# Patient Record
Sex: MALE | Race: WHITE | NOT HISPANIC OR LATINO | Employment: FULL TIME | URBAN - METROPOLITAN AREA
[De-identification: names, ages, dates, MRNs, and addresses within clinical notes are randomized per-mention and may not be internally consistent; named-entity substitution may affect disease eponyms.]

---

## 2017-02-06 ENCOUNTER — COMMUNICATION - HEALTHEAST (OUTPATIENT)
Dept: INTERNAL MEDICINE | Facility: CLINIC | Age: 22
End: 2017-02-06

## 2018-02-02 ENCOUNTER — OFFICE VISIT - HEALTHEAST (OUTPATIENT)
Dept: INTERNAL MEDICINE | Facility: CLINIC | Age: 23
End: 2018-02-02

## 2018-02-02 DIAGNOSIS — M54.2 NECK PAIN: ICD-10-CM

## 2018-02-02 ASSESSMENT — MIFFLIN-ST. JEOR: SCORE: 1865.17

## 2019-06-03 ENCOUNTER — RECORDS - HEALTHEAST (OUTPATIENT)
Dept: ADMINISTRATIVE | Facility: OTHER | Age: 24
End: 2019-06-03

## 2019-06-17 ENCOUNTER — RECORDS - HEALTHEAST (OUTPATIENT)
Dept: ADMINISTRATIVE | Facility: OTHER | Age: 24
End: 2019-06-17

## 2019-06-18 ENCOUNTER — COMMUNICATION - HEALTHEAST (OUTPATIENT)
Dept: SCHEDULING | Facility: CLINIC | Age: 24
End: 2019-06-18

## 2019-06-21 ENCOUNTER — OFFICE VISIT - HEALTHEAST (OUTPATIENT)
Dept: INTERNAL MEDICINE | Facility: CLINIC | Age: 24
End: 2019-06-21

## 2019-06-21 DIAGNOSIS — Z01.818 PRE-OP EVALUATION: ICD-10-CM

## 2019-06-21 DIAGNOSIS — M25.561 ACUTE PAIN OF RIGHT KNEE: ICD-10-CM

## 2019-06-21 LAB — HGB BLD-MCNC: 15.4 G/DL (ref 14–18)

## 2019-06-21 ASSESSMENT — MIFFLIN-ST. JEOR: SCORE: 1860.64

## 2019-06-28 ENCOUNTER — RECORDS - HEALTHEAST (OUTPATIENT)
Dept: ADMINISTRATIVE | Facility: OTHER | Age: 24
End: 2019-06-28

## 2019-08-07 ENCOUNTER — RECORDS - HEALTHEAST (OUTPATIENT)
Dept: ADMINISTRATIVE | Facility: OTHER | Age: 24
End: 2019-08-07

## 2019-09-18 ENCOUNTER — RECORDS - HEALTHEAST (OUTPATIENT)
Dept: ADMINISTRATIVE | Facility: OTHER | Age: 24
End: 2019-09-18

## 2021-05-29 NOTE — PROGRESS NOTES
Preoperative Exam    Scheduled Procedure: Right knee meniscus  Surgery Date:  6/28/19  Surgery Location: [facility], fax [phone]    Surgeon:  Dr. Snyder    Assessment/Plan:     1. Pre-op evaluation     - Hemoglobin    2. Acute pain of right knee     - Hemoglobin     Surgical Procedure Risk: Low (reported cardiac risk generally < 1%)  Have you had prior anesthesia?: Yes  Have you or any family members had a previous anesthesia reaction:  No  Do you or any family members have a history of a clotting or bleeding disorder?: No  Cardiac Risk Assessment: no increased risk for major cardiac complications    Patient approved for surgery with general or local anesthesia.    Functional Status: Independent  Patient plans to recover at home with family.     Subjective:      Jean-Paul Carver is a 23 y.o. male who presents for a preoperative consultation.  Right knee meniscus repair    All other systems reviewed and are negative, other than those listed in the HPI.    Pertinent History  Do you have difficulty breathing or chest pain after walking up a flight of stairs: No  History of obstructive sleep apnea: No  Steroid use in the last 6 months: No  Frequent Aspirin/NSAID use: No  Prior Blood Transfusion: No  Prior Blood Transfusion Reaction: No  If for some reason prior to, during or after the procedure, if it is medically indicated, would you be willing to have a blood transfusion?:  There is no transfusion refusal.     Able to walk 4 blocks or climb 2 flights of stairs: Yes  Use alcohol: 1 a week  Use tobacco: no  Use illicit drugs: no  History of kidney or liver disease: no  History of  MI or angina: no  History of irregular heartbeat: no  History of CVA: no  History of epilepsy: no  History of CHF: no  History of asthma or COPD: no  History Diabetes: no    Last tetanus:2015    ______________________________________________________________________    STOPBANG LAURYN ASSESSMENT    1.  Do  you snore loudly (louder than talking or loud enough to be heard through closed doors):  n    2.  Do you often feel tired, fatigued, or sleepy during the day:  n    3.  Has anyone observed you stop breathing during sleep:  n    4.  Do you have or are you being treated for high blood pressure:  n    5.  Body mass index > 35:  Body mass index is 21.63 kg/m .    6.  Age > 50:  23 y.o.     7.  Neck circumference greater than 40 cm:  n    8.  Gender male:  male     Total score:  0    A score of 3 or greater indicates a risk for sleep apnea (84% sensitivity).  A score of 5 or greater is more predictive of clinically relevant moderate to severe obstructive sleep apnea.    ______________________________________________________________________          Current Outpatient Medications   Medication Sig Dispense Refill     ascorbic acid, vitamin C, (ASCORBIC ACID WITH CHRIS HIPS) 500 MG tablet Take 500 mg by mouth daily.       MULTIVITAMIN ORAL Take by mouth.       methylPREDNISolone (MEDROL DOSEPACK) 4 mg tablet Follow the package directions. 21 tablet 0     No current facility-administered medications for this visit.         Allergies   Allergen Reactions     Amoxicillin Other (See Comments)     Baby-mom does not remember exactly the reaction that he had.       Patient Active Problem List   Diagnosis     Healthy adult     Neck pain       Past Medical History:   Diagnosis Date     Insomnia      Joint pain      Neuromuscular disorder (H)        No past surgical history on file.    Social History     Socioeconomic History     Marital status: Single     Spouse name: Not on file     Number of children: Not on file     Years of education: Not on file     Highest education level: Not on file   Occupational History     Not on file   Social Needs     Financial resource strain: Not on file     Food insecurity:     Worry: Not on file     Inability: Not on file     Transportation needs:     Medical: Not on file     Non-medical: Not on file  "  Tobacco Use     Smoking status: Never Smoker     Smokeless tobacco: Never Used   Substance and Sexual Activity     Alcohol use: Yes     Comment: occasional     Drug use: No     Sexual activity: Not on file   Lifestyle     Physical activity:     Days per week: Not on file     Minutes per session: Not on file     Stress: Not on file   Relationships     Social connections:     Talks on phone: Not on file     Gets together: Not on file     Attends Jehovah's witness service: Not on file     Active member of club or organization: Not on file     Attends meetings of clubs or organizations: Not on file     Relationship status: Not on file     Intimate partner violence:     Fear of current or ex partner: Not on file     Emotionally abused: Not on file     Physically abused: Not on file     Forced sexual activity: Not on file   Other Topics Concern     Not on file   Social History Narrative    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             Objective:     Vitals:    06/21/19 0939   BP: 98/76   Pulse: (!) 52   SpO2: 100%   Weight: 173 lb 1 oz (78.5 kg)   Height: 6' 3\" (1.905 m)         Physical Exam:  Gen: NAD, conversant, appears age, well-kempt  Skin: warm, dry, no rash, pallor cyanosis  HENT: normocephalic atraumatic, MMM, no oral lesions, otorrhea, rhinorrhea.   Eyes: non-icteric, extra-ocular movements intact, PERRL, conjunctivae not injected. Holding eyes open comfortably, no drainage.  CV: NRRR no m/r/g, no peripheral edema. no JVD.  Resp: CTAB no w/r/r, normal respiratory effort  GI: soft, non-tender, non-distended. No masses.  MSK: no muscle or joint swelling.  Neuro: no dysarthria or gross asymmetry  Psych: full affect, oriented x 3  Lymph: No significant cervical lymphadenopathy  Hematologic: No petechiae or purpura.      There are no Patient Instructions on file for this visit.        Labs:      Immunization History   Administered Date(s) Administered     DTaP / HiB 05/11/1998     DTaP, " historic 02/10/1997, 03/17/1997, 05/08/1997, 05/11/1998, 05/07/2001     Dtap 02/10/1997, 03/17/1997, 05/08/1997, 05/07/2001     HPV Quadrivalent 08/13/2012, 03/11/2016     Hep A, historic 08/31/2009, 08/13/2012     Hep B, Peds or Adolescent 1996, 02/10/1997, 08/19/1997     Hep B, historic 02/10/1997, 08/19/1997, 11/07/1997     Hepatitis A, Ped/Adol 2 Dose IM (18yr & under) 08/31/2009, 08/13/2012     HiB, historic,unspecified 02/10/1997, 03/17/1997, 05/08/1997, 05/11/1998     Hib (PRP-T) 02/10/1997, 03/17/1997, 05/08/1997     IPV 02/10/1997, 03/17/1997, 12/11/1997, 05/07/2001     Influenza, Live, Nasal LAIV3 08/13/2012     MMR 12/11/1997, 05/07/2001     Meningococcal MCV4 Conjugate,Unspecified 08/13/2012     Meningococcal MCV4P 08/13/2012, 08/12/2015     Td,adult,historic,unspecified 01/04/2008     Tdap 01/04/2008, 08/12/2015     Varicella 12/11/1997           Electronically signed by Brady Robb MD 06/21/19 9:41 AM

## 2021-05-29 NOTE — PATIENT INSTRUCTIONS - HE
Whole Foods, Plant-Based    Abundant vegetables.    Only whole grains.    2-4 fruits/day.    Avoid animal products such as dairy, eggs and meat. (instead, take a vitamin b12 supplement)    Avoid sugars, oils and other processed foods.    Documentary: [employer]     Web: Nutritionstudies.org, Nutritionfacts.org    Books: How Not to Die (Tonia), The [place] Study (Durga)    Avoid nsaids prior to surgery

## 2021-06-01 VITALS — BODY MASS INDEX: 21.64 KG/M2 | HEIGHT: 75 IN | WEIGHT: 174.06 LBS

## 2021-06-03 VITALS — WEIGHT: 173.06 LBS | BODY MASS INDEX: 21.52 KG/M2 | HEIGHT: 75 IN

## 2021-06-15 NOTE — PROGRESS NOTES
"[facility] Follow Up Note    Jean-Paul Carver   22 y.o. male    Date of Visit: 2/2/2018    Chief Complaint   Patient presents with     Neck Pain      just had URI - seen at [facility] 2/1/18     Subjective  Jean-Paul comes in today with acute neck pain.  2 days ago he started having neck pain later in the day to the point where he was unable to move his neck well.  He also started to get URI type symptoms.  Yesterday he went to an urgent care through an outside system and was told it was just a URI.  This morning when he awoke he was unable to get out of bed without help from his dad because it hurt to move his neck.  As he has been up and moving around, it seems to be slightly better.  The pain is in the lower neck portion and radiates to the upper shoulders but does not go down his arms.  He denies any weakness of his arms.  He denies any trauma, new pillows or new activities.  This did start after he was in a class for 6 hours and may have had his head looking down but he does not recollect doing it for long periods of time.    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       Medications were reconciled.  Allergies, social and family history, and the problem list were all reviewed and updated.      Exam  General Appearance: Pleasant and alert  Vitals:    02/02/18 0933   BP: 126/74   Patient Site: Left Arm   Patient Position: Sitting   Cuff Size: Adult Regular   Pulse: 92   Resp: 16   SpO2: 98%   Weight: 174 lb 1 oz (79 kg)   Height: 6' 3\" (1.905 m)      Body mass index is 21.76 kg/(m^2).  Wt Readings from Last 3 Encounters:   02/02/18 174 lb 1 oz (79 kg)   08/12/15 172 lb 9.6 oz (78.3 kg) (78 %, Z= 0.76)*     * Growth percentiles are based on CDC 2-20 Years data.     HEENT: Sclera are clear.   Neck: Bony prominence at about " C7/8 with some mild tenderness around this area.  Lungs: Normal respirations  Abdomen: Soft and nondistended  Extremities: No edema  Skin: No rashes  Neuro: Moves all extremities and has facial symmetry  Gait: Ambulates with a normal gait    Assessment/Plan  1. Neck pain  We discussed this, will elect to treat with a Medrol Dosepak and tramadol as needed for pain for now.  If the pain does not resolve, or gets worse would recommend an MRI scan due to the bony prominence present upon palpation..          Bianca Velasco MD  2/2/2018    Much or all of the text in this note was generated through the use of Dragon Dictate voice-to-text software. Errors in spelling or words which seem out of context are unintentional. Sound alike errors, in particular, may have escaped editing.

## 2021-06-16 PROBLEM — M54.2 NECK PAIN: Status: ACTIVE | Noted: 2018-02-02

## 2021-07-31 ENCOUNTER — HEALTH MAINTENANCE LETTER (OUTPATIENT)
Age: 26
End: 2021-07-31

## 2021-09-25 ENCOUNTER — HEALTH MAINTENANCE LETTER (OUTPATIENT)
Age: 26
End: 2021-09-25

## 2022-08-27 ENCOUNTER — HEALTH MAINTENANCE LETTER (OUTPATIENT)
Age: 27
End: 2022-08-27

## 2023-01-07 ENCOUNTER — HEALTH MAINTENANCE LETTER (OUTPATIENT)
Age: 28
End: 2023-01-07

## 2023-07-20 ENCOUNTER — APPOINTMENT (OUTPATIENT)
Dept: GENERAL RADIOLOGY | Facility: CLINIC | Age: 28
End: 2023-07-20
Attending: NURSE PRACTITIONER
Payer: COMMERCIAL

## 2023-07-20 ENCOUNTER — HOSPITAL ENCOUNTER (INPATIENT)
Facility: CLINIC | Age: 28
LOS: 2 days | Discharge: HOME | End: 2023-07-23
Attending: EMERGENCY MEDICINE | Admitting: ORTHOPAEDIC SURGERY
Payer: COMMERCIAL

## 2023-07-20 ENCOUNTER — APPOINTMENT (OUTPATIENT)
Dept: GENERAL RADIOLOGY | Facility: CLINIC | Age: 28
End: 2023-07-20
Attending: EMERGENCY MEDICINE
Payer: COMMERCIAL

## 2023-07-20 ENCOUNTER — APPOINTMENT (OUTPATIENT)
Dept: CT IMAGING | Facility: CLINIC | Age: 28
End: 2023-07-20
Attending: EMERGENCY MEDICINE
Payer: COMMERCIAL
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
## 2023-07-20 DIAGNOSIS — S82.142A CLOSED DISPLACED BICONDYLAR FRACTURE OF LEFT TIBIA: Primary | ICD-10-CM

## 2023-07-20 DIAGNOSIS — S83.105A LEFT KNEE DISLOCATION, INITIAL ENCOUNTER: ICD-10-CM

## 2023-07-20 DIAGNOSIS — S82.102A CLOSED FRACTURE OF PROXIMAL END OF LEFT TIBIA, UNSPECIFIED FRACTURE MORPHOLOGY, INITIAL ENCOUNTER: ICD-10-CM

## 2023-07-20 LAB
ABO/RH(D): NORMAL
ANION GAP SERPL CALCULATED.3IONS-SCNC: 13 MMOL/L (ref 7–15)
ANTIBODY SCREEN: NEGATIVE
APTT PPP: 27 SECONDS (ref 22–38)
BASOPHILS # BLD AUTO: 0 10E3/UL (ref 0–0.2)
BASOPHILS NFR BLD AUTO: 1 %
BUN SERPL-MCNC: 12.1 MG/DL (ref 6–20)
CALCIUM SERPL-MCNC: 9.4 MG/DL (ref 8.6–10)
CHLORIDE SERPL-SCNC: 103 MMOL/L (ref 98–107)
CREAT SERPL-MCNC: 1.1 MG/DL (ref 0.67–1.17)
DEPRECATED HCO3 PLAS-SCNC: 23 MMOL/L (ref 22–29)
EOSINOPHIL # BLD AUTO: 0.1 10E3/UL (ref 0–0.7)
EOSINOPHIL NFR BLD AUTO: 1 %
ERYTHROCYTE [DISTWIDTH] IN BLOOD BY AUTOMATED COUNT: 11.7 % (ref 10–15)
GFR SERPL CREATININE-BSD FRML MDRD: >90 ML/MIN/1.73M2
GLUCOSE SERPL-MCNC: 101 MG/DL (ref 70–99)
HCT VFR BLD AUTO: 45.2 % (ref 40–53)
HGB BLD-MCNC: 15.8 G/DL (ref 13.3–17.7)
IMM GRANULOCYTES # BLD: 0 10E3/UL
IMM GRANULOCYTES NFR BLD: 1 %
INR PPP: 1.06 (ref 0.85–1.15)
LYMPHOCYTES # BLD AUTO: 1.6 10E3/UL (ref 0.8–5.3)
LYMPHOCYTES NFR BLD AUTO: 20 %
MCH RBC QN AUTO: 31 PG (ref 26.5–33)
MCHC RBC AUTO-ENTMCNC: 35 G/DL (ref 31.5–36.5)
MCV RBC AUTO: 89 FL (ref 78–100)
MONOCYTES # BLD AUTO: 0.8 10E3/UL (ref 0–1.3)
MONOCYTES NFR BLD AUTO: 10 %
NEUTROPHILS # BLD AUTO: 5.4 10E3/UL (ref 1.6–8.3)
NEUTROPHILS NFR BLD AUTO: 67 %
NRBC # BLD AUTO: 0 10E3/UL
NRBC BLD AUTO-RTO: 0 /100
PLATELET # BLD AUTO: 167 10E3/UL (ref 150–450)
POTASSIUM SERPL-SCNC: 3.4 MMOL/L (ref 3.4–5.3)
RBC # BLD AUTO: 5.1 10E6/UL (ref 4.4–5.9)
SODIUM SERPL-SCNC: 139 MMOL/L (ref 136–145)
SPECIMEN EXPIRATION DATE: NORMAL
WBC # BLD AUTO: 8 10E3/UL (ref 4–11)

## 2023-07-20 PROCEDURE — 80048 BASIC METABOLIC PNL TOTAL CA: CPT | Performed by: EMERGENCY MEDICINE

## 2023-07-20 PROCEDURE — 85025 COMPLETE CBC W/AUTO DIFF WBC: CPT | Performed by: EMERGENCY MEDICINE

## 2023-07-20 PROCEDURE — 250N000013 HC RX MED GY IP 250 OP 250 PS 637: Performed by: EMERGENCY MEDICINE

## 2023-07-20 PROCEDURE — 73562 X-RAY EXAM OF KNEE 3: CPT | Mod: 26 | Performed by: RADIOLOGY

## 2023-07-20 PROCEDURE — 36415 COLL VENOUS BLD VENIPUNCTURE: CPT | Performed by: EMERGENCY MEDICINE

## 2023-07-20 PROCEDURE — 73706 CT ANGIO LWR EXTR W/O&W/DYE: CPT | Mod: LT

## 2023-07-20 PROCEDURE — 96375 TX/PRO/DX INJ NEW DRUG ADDON: CPT | Performed by: EMERGENCY MEDICINE

## 2023-07-20 PROCEDURE — 73706 CT ANGIO LWR EXTR W/O&W/DYE: CPT | Mod: 26 | Performed by: RADIOLOGY

## 2023-07-20 PROCEDURE — 258N000003 HC RX IP 258 OP 636: Performed by: EMERGENCY MEDICINE

## 2023-07-20 PROCEDURE — 85610 PROTHROMBIN TIME: CPT | Performed by: EMERGENCY MEDICINE

## 2023-07-20 PROCEDURE — 99285 EMERGENCY DEPT VISIT HI MDM: CPT | Mod: 25 | Performed by: EMERGENCY MEDICINE

## 2023-07-20 PROCEDURE — 85730 THROMBOPLASTIN TIME PARTIAL: CPT | Performed by: EMERGENCY MEDICINE

## 2023-07-20 PROCEDURE — 86900 BLOOD TYPING SEROLOGIC ABO: CPT | Performed by: EMERGENCY MEDICINE

## 2023-07-20 PROCEDURE — 250N000011 HC RX IP 250 OP 636

## 2023-07-20 PROCEDURE — 73590 X-RAY EXAM OF LOWER LEG: CPT | Mod: 26 | Performed by: RADIOLOGY

## 2023-07-20 PROCEDURE — 73560 X-RAY EXAM OF KNEE 1 OR 2: CPT | Mod: 26 | Performed by: RADIOLOGY

## 2023-07-20 PROCEDURE — 250N000011 HC RX IP 250 OP 636: Performed by: EMERGENCY MEDICINE

## 2023-07-20 PROCEDURE — 250N000011 HC RX IP 250 OP 636: Mod: JZ | Performed by: NURSE PRACTITIONER

## 2023-07-20 PROCEDURE — 96374 THER/PROPH/DIAG INJ IV PUSH: CPT | Performed by: EMERGENCY MEDICINE

## 2023-07-20 PROCEDURE — 96376 TX/PRO/DX INJ SAME DRUG ADON: CPT | Performed by: EMERGENCY MEDICINE

## 2023-07-20 PROCEDURE — 99285 EMERGENCY DEPT VISIT HI MDM: CPT | Performed by: EMERGENCY MEDICINE

## 2023-07-20 PROCEDURE — 73590 X-RAY EXAM OF LOWER LEG: CPT | Mod: LT

## 2023-07-20 PROCEDURE — 96361 HYDRATE IV INFUSION ADD-ON: CPT | Performed by: EMERGENCY MEDICINE

## 2023-07-20 PROCEDURE — 73562 X-RAY EXAM OF KNEE 3: CPT | Mod: LT

## 2023-07-20 PROCEDURE — 999N000065 XR KNEE PORT LEFT 1/2 VIEWS: Mod: LT

## 2023-07-20 RX ORDER — ONDANSETRON 2 MG/ML
4 INJECTION INTRAMUSCULAR; INTRAVENOUS ONCE
Status: COMPLETED | OUTPATIENT
Start: 2023-07-20 | End: 2023-07-20

## 2023-07-20 RX ORDER — SODIUM CHLORIDE 9 MG/ML
INJECTION, SOLUTION INTRAVENOUS CONTINUOUS PRN
Status: COMPLETED | OUTPATIENT
Start: 2023-07-20 | End: 2023-07-20

## 2023-07-20 RX ORDER — PROPOFOL 10 MG/ML
INJECTION, EMULSION INTRAVENOUS
Status: COMPLETED
Start: 2023-07-20 | End: 2023-07-20

## 2023-07-20 RX ORDER — PROPOFOL 10 MG/ML
.5-1 INJECTION, EMULSION INTRAVENOUS ONCE
Status: COMPLETED | OUTPATIENT
Start: 2023-07-20 | End: 2023-07-20

## 2023-07-20 RX ORDER — IOPAMIDOL 755 MG/ML
100 INJECTION, SOLUTION INTRAVASCULAR ONCE
Status: COMPLETED | OUTPATIENT
Start: 2023-07-20 | End: 2023-07-21

## 2023-07-20 RX ORDER — OXYCODONE HYDROCHLORIDE 5 MG/1
5 TABLET ORAL ONCE
Status: COMPLETED | OUTPATIENT
Start: 2023-07-20 | End: 2023-07-20

## 2023-07-20 RX ORDER — KETOROLAC TROMETHAMINE 30 MG/ML
30 INJECTION, SOLUTION INTRAMUSCULAR; INTRAVENOUS ONCE
Status: COMPLETED | OUTPATIENT
Start: 2023-07-20 | End: 2023-07-20

## 2023-07-20 RX ORDER — PROPOFOL 10 MG/ML
INJECTION, EMULSION INTRAVENOUS DAILY PRN
Status: COMPLETED | OUTPATIENT
Start: 2023-07-20 | End: 2023-07-20

## 2023-07-20 RX ADMIN — ONDANSETRON 4 MG: 2 INJECTION INTRAMUSCULAR; INTRAVENOUS at 20:42

## 2023-07-20 RX ADMIN — HYDROMORPHONE HYDROCHLORIDE 1 MG: 1 INJECTION, SOLUTION INTRAMUSCULAR; INTRAVENOUS; SUBCUTANEOUS at 20:42

## 2023-07-20 RX ADMIN — PROPOFOL 80 MG: 10 INJECTION, EMULSION INTRAVENOUS at 23:00

## 2023-07-20 RX ADMIN — KETOROLAC TROMETHAMINE 30 MG: 30 INJECTION, SOLUTION INTRAMUSCULAR at 20:40

## 2023-07-20 RX ADMIN — PROPOFOL 40 MG: 10 INJECTION, EMULSION INTRAVENOUS at 23:02

## 2023-07-20 RX ADMIN — PROPOFOL 20 MG: 10 INJECTION, EMULSION INTRAVENOUS at 23:03

## 2023-07-20 RX ADMIN — OXYCODONE HYDROCHLORIDE 5 MG: 5 TABLET ORAL at 20:40

## 2023-07-20 RX ADMIN — PROPOFOL 40 MG: 10 INJECTION, EMULSION INTRAVENOUS at 23:01

## 2023-07-20 RX ADMIN — HYDROMORPHONE HYDROCHLORIDE 1 MG: 1 INJECTION, SOLUTION INTRAMUSCULAR; INTRAVENOUS; SUBCUTANEOUS at 22:30

## 2023-07-20 RX ADMIN — PROPOFOL 80 MG: 10 INJECTION, EMULSION INTRAVENOUS at 23:29

## 2023-07-20 RX ADMIN — SODIUM CHLORIDE 1000 ML: 0.9 INJECTION, SOLUTION INTRAVENOUS at 23:02

## 2023-07-20 ASSESSMENT — ACTIVITIES OF DAILY LIVING (ADL)
ADLS_ACUITY_SCORE: 35
ADLS_ACUITY_SCORE: 35

## 2023-07-21 ENCOUNTER — ANESTHESIA EVENT (OUTPATIENT)
Dept: SURGERY | Facility: CLINIC | Age: 28
End: 2023-07-21
Payer: COMMERCIAL

## 2023-07-21 ENCOUNTER — APPOINTMENT (OUTPATIENT)
Dept: GENERAL RADIOLOGY | Facility: CLINIC | Age: 28
End: 2023-07-21
Attending: ORTHOPAEDIC SURGERY
Payer: COMMERCIAL

## 2023-07-21 ENCOUNTER — ANESTHESIA (OUTPATIENT)
Dept: SURGERY | Facility: CLINIC | Age: 28
End: 2023-07-21
Payer: COMMERCIAL

## 2023-07-21 ENCOUNTER — APPOINTMENT (OUTPATIENT)
Dept: CT IMAGING | Facility: CLINIC | Age: 28
End: 2023-07-21
Attending: ORTHOPAEDIC SURGERY
Payer: COMMERCIAL

## 2023-07-21 PROBLEM — S83.105A LEFT KNEE DISLOCATION, INITIAL ENCOUNTER: Status: ACTIVE | Noted: 2023-07-21

## 2023-07-21 PROBLEM — S82.102A CLOSED FRACTURE OF PROXIMAL END OF LEFT TIBIA, UNSPECIFIED FRACTURE MORPHOLOGY, INITIAL ENCOUNTER: Status: ACTIVE | Noted: 2023-07-21

## 2023-07-21 PROCEDURE — 250N000009 HC RX 250: Performed by: EMERGENCY MEDICINE

## 2023-07-21 PROCEDURE — 360N000082 HC SURGERY LEVEL 2 W/ FLUORO, PER MIN: Performed by: ORTHOPAEDIC SURGERY

## 2023-07-21 PROCEDURE — 250N000011 HC RX IP 250 OP 636: Performed by: ORTHOPAEDIC SURGERY

## 2023-07-21 PROCEDURE — 250N000011 HC RX IP 250 OP 636: Mod: JZ | Performed by: NURSE ANESTHETIST, CERTIFIED REGISTERED

## 2023-07-21 PROCEDURE — 120N000002 HC R&B MED SURG/OB UMMC

## 2023-07-21 PROCEDURE — 250N000025 HC SEVOFLURANE, PER MIN: Performed by: ORTHOPAEDIC SURGERY

## 2023-07-21 PROCEDURE — 272N000001 HC OR GENERAL SUPPLY STERILE: Performed by: ORTHOPAEDIC SURGERY

## 2023-07-21 PROCEDURE — 250N000009 HC RX 250: Performed by: ORTHOPAEDIC SURGERY

## 2023-07-21 PROCEDURE — 250N000013 HC RX MED GY IP 250 OP 250 PS 637

## 2023-07-21 PROCEDURE — 250N000011 HC RX IP 250 OP 636: Performed by: EMERGENCY MEDICINE

## 2023-07-21 PROCEDURE — 73700 CT LOWER EXTREMITY W/O DYE: CPT | Mod: 26 | Performed by: RADIOLOGY

## 2023-07-21 PROCEDURE — 258N000003 HC RX IP 258 OP 636: Performed by: NURSE ANESTHETIST, CERTIFIED REGISTERED

## 2023-07-21 PROCEDURE — L4396 STATIC OR DYNAMI AFO PRE CST: HCPCS

## 2023-07-21 PROCEDURE — 999N000065 XR KNEE PORT LEFT 1/2 VIEWS: Mod: LT

## 2023-07-21 PROCEDURE — 999N000180 XR SURGERY CARM FLUORO LESS THAN 5 MIN: Mod: TC

## 2023-07-21 PROCEDURE — 710N000010 HC RECOVERY PHASE 1, LEVEL 2, PER MIN: Performed by: ORTHOPAEDIC SURGERY

## 2023-07-21 PROCEDURE — 250N000011 HC RX IP 250 OP 636: Mod: JZ

## 2023-07-21 PROCEDURE — C1713 ANCHOR/SCREW BN/BN,TIS/BN: HCPCS | Performed by: ORTHOPAEDIC SURGERY

## 2023-07-21 PROCEDURE — 250N000011 HC RX IP 250 OP 636: Performed by: ANESTHESIOLOGY

## 2023-07-21 PROCEDURE — 250N000009 HC RX 250: Performed by: NURSE ANESTHETIST, CERTIFIED REGISTERED

## 2023-07-21 PROCEDURE — 73700 CT LOWER EXTREMITY W/O DYE: CPT | Mod: LT

## 2023-07-21 PROCEDURE — 250N000011 HC RX IP 250 OP 636: Mod: JZ | Performed by: ORTHOPAEDIC SURGERY

## 2023-07-21 PROCEDURE — 96361 HYDRATE IV INFUSION ADD-ON: CPT | Performed by: EMERGENCY MEDICINE

## 2023-07-21 PROCEDURE — 0QSH35Z REPOSITION LEFT TIBIA WITH EXTERNAL FIXATION DEVICE, PERCUTANEOUS APPROACH: ICD-10-PCS | Performed by: ORTHOPAEDIC SURGERY

## 2023-07-21 PROCEDURE — 96376 TX/PRO/DX INJ SAME DRUG ADON: CPT | Performed by: EMERGENCY MEDICINE

## 2023-07-21 PROCEDURE — 20692 APPL MLTPLN UNI EXT FIXJ SYS: CPT | Mod: LT | Performed by: ORTHOPAEDIC SURGERY

## 2023-07-21 PROCEDURE — 999N000141 HC STATISTIC PRE-PROCEDURE NURSING ASSESSMENT: Performed by: ORTHOPAEDIC SURGERY

## 2023-07-21 PROCEDURE — 370N000017 HC ANESTHESIA TECHNICAL FEE, PER MIN: Performed by: ORTHOPAEDIC SURGERY

## 2023-07-21 DEVICE — IMPLANTABLE DEVICE: Type: IMPLANTABLE DEVICE | Site: LEG | Status: FUNCTIONAL

## 2023-07-21 RX ORDER — OXYCODONE HYDROCHLORIDE 5 MG/1
5 TABLET ORAL EVERY 4 HOURS PRN
Status: DISCONTINUED | OUTPATIENT
Start: 2023-07-21 | End: 2023-07-24 | Stop reason: HOSPADM

## 2023-07-21 RX ORDER — HYDROMORPHONE HYDROCHLORIDE 1 MG/ML
0.2 INJECTION, SOLUTION INTRAMUSCULAR; INTRAVENOUS; SUBCUTANEOUS EVERY 5 MIN PRN
Status: DISCONTINUED | OUTPATIENT
Start: 2023-07-21 | End: 2023-07-21 | Stop reason: HOSPADM

## 2023-07-21 RX ORDER — TRANEXAMIC ACID 10 MG/ML
1 INJECTION, SOLUTION INTRAVENOUS ONCE
Status: COMPLETED | OUTPATIENT
Start: 2023-07-21 | End: 2023-07-21

## 2023-07-21 RX ORDER — ONDANSETRON 2 MG/ML
INJECTION INTRAMUSCULAR; INTRAVENOUS PRN
Status: DISCONTINUED | OUTPATIENT
Start: 2023-07-21 | End: 2023-07-21

## 2023-07-21 RX ORDER — METHOCARBAMOL 750 MG/1
750 TABLET, FILM COATED ORAL EVERY 6 HOURS PRN
Status: DISCONTINUED | OUTPATIENT
Start: 2023-07-21 | End: 2023-07-24 | Stop reason: HOSPADM

## 2023-07-21 RX ORDER — HYDROMORPHONE HYDROCHLORIDE 1 MG/ML
0.4 INJECTION, SOLUTION INTRAMUSCULAR; INTRAVENOUS; SUBCUTANEOUS EVERY 5 MIN PRN
Status: DISCONTINUED | OUTPATIENT
Start: 2023-07-21 | End: 2023-07-21 | Stop reason: HOSPADM

## 2023-07-21 RX ORDER — HYDROMORPHONE HYDROCHLORIDE 1 MG/ML
0.2 INJECTION, SOLUTION INTRAMUSCULAR; INTRAVENOUS; SUBCUTANEOUS
Status: DISCONTINUED | OUTPATIENT
Start: 2023-07-21 | End: 2023-07-24 | Stop reason: HOSPADM

## 2023-07-21 RX ORDER — POLYETHYLENE GLYCOL 3350 17 G/17G
17 POWDER, FOR SOLUTION ORAL DAILY
Status: DISCONTINUED | OUTPATIENT
Start: 2023-07-22 | End: 2023-07-24 | Stop reason: HOSPADM

## 2023-07-21 RX ORDER — HYDROXYZINE HYDROCHLORIDE 25 MG/1
25 TABLET, FILM COATED ORAL EVERY 6 HOURS PRN
Status: DISCONTINUED | OUTPATIENT
Start: 2023-07-21 | End: 2023-07-24 | Stop reason: HOSPADM

## 2023-07-21 RX ORDER — ACETAMINOPHEN 325 MG/1
325-650 TABLET ORAL EVERY 6 HOURS PRN
COMMUNITY

## 2023-07-21 RX ORDER — ACETAMINOPHEN 325 MG/1
975 TABLET ORAL EVERY 8 HOURS
Status: DISCONTINUED | OUTPATIENT
Start: 2023-07-21 | End: 2023-07-24 | Stop reason: HOSPADM

## 2023-07-21 RX ORDER — ACETAMINOPHEN 325 MG/1
650 TABLET ORAL EVERY 4 HOURS PRN
Status: DISCONTINUED | OUTPATIENT
Start: 2023-07-24 | End: 2023-07-24 | Stop reason: HOSPADM

## 2023-07-21 RX ORDER — MULTIPLE VITAMINS W/ MINERALS TAB 9MG-400MCG
1 TAB ORAL DAILY
COMMUNITY

## 2023-07-21 RX ORDER — ONDANSETRON 2 MG/ML
4 INJECTION INTRAMUSCULAR; INTRAVENOUS EVERY 6 HOURS PRN
Status: DISCONTINUED | OUTPATIENT
Start: 2023-07-21 | End: 2023-07-24 | Stop reason: HOSPADM

## 2023-07-21 RX ORDER — OXYCODONE HYDROCHLORIDE 10 MG/1
10 TABLET ORAL EVERY 4 HOURS PRN
Status: DISCONTINUED | OUTPATIENT
Start: 2023-07-21 | End: 2023-07-24 | Stop reason: HOSPADM

## 2023-07-21 RX ORDER — ONDANSETRON 2 MG/ML
4 INJECTION INTRAMUSCULAR; INTRAVENOUS EVERY 30 MIN PRN
Status: DISCONTINUED | OUTPATIENT
Start: 2023-07-21 | End: 2023-07-21 | Stop reason: HOSPADM

## 2023-07-21 RX ORDER — SODIUM CHLORIDE, SODIUM LACTATE, POTASSIUM CHLORIDE, CALCIUM CHLORIDE 600; 310; 30; 20 MG/100ML; MG/100ML; MG/100ML; MG/100ML
INJECTION, SOLUTION INTRAVENOUS CONTINUOUS
Status: DISCONTINUED | OUTPATIENT
Start: 2023-07-21 | End: 2023-07-24 | Stop reason: HOSPADM

## 2023-07-21 RX ORDER — FENTANYL CITRATE 50 UG/ML
INJECTION, SOLUTION INTRAMUSCULAR; INTRAVENOUS PRN
Status: DISCONTINUED | OUTPATIENT
Start: 2023-07-21 | End: 2023-07-21

## 2023-07-21 RX ORDER — CEFAZOLIN SODIUM/WATER 2 G/20 ML
2 SYRINGE (ML) INTRAVENOUS EVERY 8 HOURS
Status: DISCONTINUED | OUTPATIENT
Start: 2023-07-21 | End: 2023-07-21

## 2023-07-21 RX ORDER — IBUPROFEN 200 MG
200 TABLET ORAL EVERY 4 HOURS PRN
COMMUNITY

## 2023-07-21 RX ORDER — AMOXICILLIN 250 MG
1 CAPSULE ORAL 2 TIMES DAILY
Status: DISCONTINUED | OUTPATIENT
Start: 2023-07-21 | End: 2023-07-24 | Stop reason: HOSPADM

## 2023-07-21 RX ORDER — CEFAZOLIN SODIUM 2 G/100ML
2 INJECTION, SOLUTION INTRAVENOUS EVERY 8 HOURS
Status: COMPLETED | OUTPATIENT
Start: 2023-07-21 | End: 2023-07-22

## 2023-07-21 RX ORDER — LANOLIN ALCOHOL/MO/W.PET/CERES
3 CREAM (GRAM) TOPICAL
Status: DISCONTINUED | OUTPATIENT
Start: 2023-07-21 | End: 2023-07-24 | Stop reason: HOSPADM

## 2023-07-21 RX ORDER — OXYCODONE HYDROCHLORIDE 5 MG/1
5 TABLET ORAL
Status: DISCONTINUED | OUTPATIENT
Start: 2023-07-21 | End: 2023-07-21 | Stop reason: HOSPADM

## 2023-07-21 RX ORDER — ASPIRIN 81 MG/1
81 TABLET ORAL 2 TIMES DAILY
Status: DISCONTINUED | OUTPATIENT
Start: 2023-07-22 | End: 2023-07-21

## 2023-07-21 RX ORDER — LABETALOL HYDROCHLORIDE 5 MG/ML
10 INJECTION, SOLUTION INTRAVENOUS
Status: DISCONTINUED | OUTPATIENT
Start: 2023-07-21 | End: 2023-07-21 | Stop reason: HOSPADM

## 2023-07-21 RX ORDER — KETOROLAC TROMETHAMINE 30 MG/ML
15 INJECTION, SOLUTION INTRAMUSCULAR; INTRAVENOUS EVERY 6 HOURS
Status: COMPLETED | OUTPATIENT
Start: 2023-07-21 | End: 2023-07-22

## 2023-07-21 RX ORDER — DEXAMETHASONE SODIUM PHOSPHATE 4 MG/ML
INJECTION, SOLUTION INTRA-ARTICULAR; INTRALESIONAL; INTRAMUSCULAR; INTRAVENOUS; SOFT TISSUE PRN
Status: DISCONTINUED | OUTPATIENT
Start: 2023-07-21 | End: 2023-07-21

## 2023-07-21 RX ORDER — SODIUM CHLORIDE, SODIUM LACTATE, POTASSIUM CHLORIDE, CALCIUM CHLORIDE 600; 310; 30; 20 MG/100ML; MG/100ML; MG/100ML; MG/100ML
INJECTION, SOLUTION INTRAVENOUS CONTINUOUS PRN
Status: DISCONTINUED | OUTPATIENT
Start: 2023-07-21 | End: 2023-07-21

## 2023-07-21 RX ORDER — HYDROMORPHONE HYDROCHLORIDE 1 MG/ML
0.4 INJECTION, SOLUTION INTRAMUSCULAR; INTRAVENOUS; SUBCUTANEOUS
Status: DISCONTINUED | OUTPATIENT
Start: 2023-07-21 | End: 2023-07-24 | Stop reason: HOSPADM

## 2023-07-21 RX ORDER — BISACODYL 10 MG
10 SUPPOSITORY, RECTAL RECTAL DAILY PRN
Status: DISCONTINUED | OUTPATIENT
Start: 2023-07-21 | End: 2023-07-24 | Stop reason: HOSPADM

## 2023-07-21 RX ORDER — CEFAZOLIN SODIUM 2 G/100ML
2 INJECTION, SOLUTION INTRAVENOUS
Status: CANCELLED | OUTPATIENT
Start: 2023-07-21

## 2023-07-21 RX ORDER — UBIDECARENONE 75 MG
100 CAPSULE ORAL DAILY
COMMUNITY

## 2023-07-21 RX ORDER — PROCHLORPERAZINE MALEATE 5 MG
10 TABLET ORAL EVERY 6 HOURS PRN
Status: DISCONTINUED | OUTPATIENT
Start: 2023-07-21 | End: 2023-07-24 | Stop reason: HOSPADM

## 2023-07-21 RX ORDER — ENOXAPARIN SODIUM 100 MG/ML
40 INJECTION SUBCUTANEOUS ONCE
Status: DISCONTINUED | OUTPATIENT
Start: 2023-07-21 | End: 2023-07-21

## 2023-07-21 RX ORDER — PROPOFOL 10 MG/ML
INJECTION, EMULSION INTRAVENOUS PRN
Status: DISCONTINUED | OUTPATIENT
Start: 2023-07-21 | End: 2023-07-21

## 2023-07-21 RX ORDER — ONDANSETRON 4 MG/1
4 TABLET, ORALLY DISINTEGRATING ORAL EVERY 30 MIN PRN
Status: DISCONTINUED | OUTPATIENT
Start: 2023-07-21 | End: 2023-07-21 | Stop reason: HOSPADM

## 2023-07-21 RX ORDER — HEPARIN SODIUM 5000 [USP'U]/.5ML
5000 INJECTION, SOLUTION INTRAVENOUS; SUBCUTANEOUS ONCE
Status: COMPLETED | OUTPATIENT
Start: 2023-07-21 | End: 2023-07-21

## 2023-07-21 RX ORDER — LIDOCAINE HYDROCHLORIDE 20 MG/ML
INJECTION, SOLUTION INFILTRATION; PERINEURAL PRN
Status: DISCONTINUED | OUTPATIENT
Start: 2023-07-21 | End: 2023-07-21

## 2023-07-21 RX ORDER — CEFAZOLIN SODIUM 2 G/100ML
2 INJECTION, SOLUTION INTRAVENOUS SEE ADMIN INSTRUCTIONS
Status: CANCELLED | OUTPATIENT
Start: 2023-07-21

## 2023-07-21 RX ORDER — SODIUM CHLORIDE, SODIUM LACTATE, POTASSIUM CHLORIDE, CALCIUM CHLORIDE 600; 310; 30; 20 MG/100ML; MG/100ML; MG/100ML; MG/100ML
INJECTION, SOLUTION INTRAVENOUS CONTINUOUS
Status: DISCONTINUED | OUTPATIENT
Start: 2023-07-21 | End: 2023-07-21 | Stop reason: HOSPADM

## 2023-07-21 RX ORDER — MAGNESIUM HYDROXIDE/ALUMINUM HYDROXICE/SIMETHICONE 120; 1200; 1200 MG/30ML; MG/30ML; MG/30ML
30 SUSPENSION ORAL EVERY 4 HOURS PRN
Status: DISCONTINUED | OUTPATIENT
Start: 2023-07-21 | End: 2023-07-24 | Stop reason: HOSPADM

## 2023-07-21 RX ORDER — ACETAMINOPHEN 325 MG/1
975 TABLET ORAL ONCE
Status: DISCONTINUED | OUTPATIENT
Start: 2023-07-21 | End: 2023-07-21 | Stop reason: HOSPADM

## 2023-07-21 RX ORDER — HYDRALAZINE HYDROCHLORIDE 20 MG/ML
2.5-5 INJECTION INTRAMUSCULAR; INTRAVENOUS EVERY 10 MIN PRN
Status: DISCONTINUED | OUTPATIENT
Start: 2023-07-21 | End: 2023-07-21 | Stop reason: HOSPADM

## 2023-07-21 RX ORDER — ONDANSETRON 4 MG/1
4 TABLET, ORALLY DISINTEGRATING ORAL EVERY 6 HOURS PRN
Status: DISCONTINUED | OUTPATIENT
Start: 2023-07-21 | End: 2023-07-24 | Stop reason: HOSPADM

## 2023-07-21 RX ORDER — HALOPERIDOL 5 MG/ML
1 INJECTION INTRAMUSCULAR
Status: DISCONTINUED | OUTPATIENT
Start: 2023-07-21 | End: 2023-07-21 | Stop reason: HOSPADM

## 2023-07-21 RX ORDER — VITAMIN B COMPLEX
25 TABLET ORAL DAILY
COMMUNITY

## 2023-07-21 RX ORDER — LIDOCAINE 40 MG/G
CREAM TOPICAL
Status: DISCONTINUED | OUTPATIENT
Start: 2023-07-21 | End: 2023-07-24 | Stop reason: HOSPADM

## 2023-07-21 RX ADMIN — SODIUM CHLORIDE, SODIUM LACTATE, POTASSIUM CHLORIDE, CALCIUM CHLORIDE: 600; 310; 30; 20 INJECTION, SOLUTION INTRAVENOUS at 11:44

## 2023-07-21 RX ADMIN — TRANEXAMIC ACID 1 G: 10 INJECTION, SOLUTION INTRAVENOUS at 11:09

## 2023-07-21 RX ADMIN — PHENYLEPHRINE HYDROCHLORIDE 150 MCG: 10 INJECTION INTRAVENOUS at 10:53

## 2023-07-21 RX ADMIN — HYDROMORPHONE HYDROCHLORIDE 0.4 MG: 1 INJECTION, SOLUTION INTRAMUSCULAR; INTRAVENOUS; SUBCUTANEOUS at 12:29

## 2023-07-21 RX ADMIN — DEXAMETHASONE SODIUM PHOSPHATE 4 MG: 4 INJECTION, SOLUTION INTRA-ARTICULAR; INTRALESIONAL; INTRAMUSCULAR; INTRAVENOUS; SOFT TISSUE at 10:49

## 2023-07-21 RX ADMIN — HYDROMORPHONE HYDROCHLORIDE 1 MG: 1 INJECTION, SOLUTION INTRAMUSCULAR; INTRAVENOUS; SUBCUTANEOUS at 04:53

## 2023-07-21 RX ADMIN — SODIUM CHLORIDE, SODIUM LACTATE, POTASSIUM CHLORIDE, CALCIUM CHLORIDE: 600; 310; 30; 20 INJECTION, SOLUTION INTRAVENOUS at 10:32

## 2023-07-21 RX ADMIN — HYDROMORPHONE HYDROCHLORIDE 1 MG: 1 INJECTION, SOLUTION INTRAMUSCULAR; INTRAVENOUS; SUBCUTANEOUS at 02:06

## 2023-07-21 RX ADMIN — LIDOCAINE HYDROCHLORIDE 60 MG: 20 INJECTION, SOLUTION INFILTRATION; PERINEURAL at 10:43

## 2023-07-21 RX ADMIN — KETOROLAC TROMETHAMINE 15 MG: 30 INJECTION, SOLUTION INTRAMUSCULAR; INTRAVENOUS at 18:24

## 2023-07-21 RX ADMIN — CEFAZOLIN SODIUM 2 G: 2 INJECTION, SOLUTION INTRAVENOUS at 18:32

## 2023-07-21 RX ADMIN — OXYCODONE HYDROCHLORIDE 5 MG: 5 TABLET ORAL at 13:28

## 2023-07-21 RX ADMIN — SODIUM CHLORIDE, PRESERVATIVE FREE 98 ML: 5 INJECTION INTRAVENOUS at 00:42

## 2023-07-21 RX ADMIN — SENNOSIDES AND DOCUSATE SODIUM 1 TABLET: 50; 8.6 TABLET ORAL at 20:34

## 2023-07-21 RX ADMIN — PROPOFOL 200 MG: 10 INJECTION, EMULSION INTRAVENOUS at 10:43

## 2023-07-21 RX ADMIN — OXYCODONE HYDROCHLORIDE 5 MG: 5 TABLET ORAL at 20:34

## 2023-07-21 RX ADMIN — FENTANYL CITRATE 50 MCG: 50 INJECTION, SOLUTION INTRAMUSCULAR; INTRAVENOUS at 11:13

## 2023-07-21 RX ADMIN — IOPAMIDOL 100 ML: 755 INJECTION, SOLUTION INTRAVENOUS at 00:42

## 2023-07-21 RX ADMIN — HEPARIN SODIUM 5000 UNITS: 5000 INJECTION, SOLUTION INTRAVENOUS; SUBCUTANEOUS at 23:48

## 2023-07-21 RX ADMIN — HYDROMORPHONE HYDROCHLORIDE 0.4 MG: 1 INJECTION, SOLUTION INTRAMUSCULAR; INTRAVENOUS; SUBCUTANEOUS at 12:20

## 2023-07-21 RX ADMIN — HYDROMORPHONE HYDROCHLORIDE 0.4 MG: 1 INJECTION, SOLUTION INTRAMUSCULAR; INTRAVENOUS; SUBCUTANEOUS at 13:02

## 2023-07-21 RX ADMIN — MIDAZOLAM 2 MG: 1 INJECTION INTRAMUSCULAR; INTRAVENOUS at 10:32

## 2023-07-21 RX ADMIN — ACETAMINOPHEN 975 MG: 325 TABLET, FILM COATED ORAL at 20:33

## 2023-07-21 RX ADMIN — HYDROMORPHONE HYDROCHLORIDE 1 MG: 1 INJECTION, SOLUTION INTRAMUSCULAR; INTRAVENOUS; SUBCUTANEOUS at 07:40

## 2023-07-21 RX ADMIN — HYDROMORPHONE HYDROCHLORIDE 0.5 MG: 1 INJECTION, SOLUTION INTRAMUSCULAR; INTRAVENOUS; SUBCUTANEOUS at 12:12

## 2023-07-21 RX ADMIN — ACETAMINOPHEN 975 MG: 325 TABLET, FILM COATED ORAL at 13:57

## 2023-07-21 RX ADMIN — HYDROMORPHONE HYDROCHLORIDE 0.2 MG: 1 INJECTION, SOLUTION INTRAMUSCULAR; INTRAVENOUS; SUBCUTANEOUS at 15:25

## 2023-07-21 RX ADMIN — HYDROMORPHONE HYDROCHLORIDE 0.4 MG: 1 INJECTION, SOLUTION INTRAMUSCULAR; INTRAVENOUS; SUBCUTANEOUS at 12:47

## 2023-07-21 RX ADMIN — ONDANSETRON 4 MG: 2 INJECTION INTRAMUSCULAR; INTRAVENOUS at 11:47

## 2023-07-21 RX ADMIN — FENTANYL CITRATE 50 MCG: 50 INJECTION, SOLUTION INTRAMUSCULAR; INTRAVENOUS at 10:43

## 2023-07-21 RX ADMIN — Medication 2 G: at 10:32

## 2023-07-21 RX ADMIN — KETOROLAC TROMETHAMINE 15 MG: 30 INJECTION, SOLUTION INTRAMUSCULAR; INTRAVENOUS at 12:49

## 2023-07-21 RX ADMIN — HYDROXYZINE HYDROCHLORIDE 25 MG: 25 TABLET, FILM COATED ORAL at 13:58

## 2023-07-21 RX ADMIN — PHENYLEPHRINE HYDROCHLORIDE 100 MCG: 10 INJECTION INTRAVENOUS at 10:49

## 2023-07-21 ASSESSMENT — ACTIVITIES OF DAILY LIVING (ADL)
ADLS_ACUITY_SCORE: 35
ADLS_ACUITY_SCORE: 37
WEAR_GLASSES_OR_BLIND: NO
ADLS_ACUITY_SCORE: 37
ADLS_ACUITY_SCORE: 35
TOILETING_ISSUES: NO
ADLS_ACUITY_SCORE: 35
ADLS_ACUITY_SCORE: 35
DIFFICULTY_EATING/SWALLOWING: NO
ADLS_ACUITY_SCORE: 20
DIFFICULTY_COMMUNICATING: NO
ADLS_ACUITY_SCORE: 22
CONCENTRATING,_REMEMBERING_OR_MAKING_DECISIONS_DIFFICULTY: NO
DOING_ERRANDS_INDEPENDENTLY_DIFFICULTY: NO
ADLS_ACUITY_SCORE: 37
ADLS_ACUITY_SCORE: 35
WALKING_OR_CLIMBING_STAIRS_DIFFICULTY: NO
ADLS_ACUITY_SCORE: 35
FALL_HISTORY_WITHIN_LAST_SIX_MONTHS: NO
ADLS_ACUITY_SCORE: 20
CHANGE_IN_FUNCTIONAL_STATUS_SINCE_ONSET_OF_CURRENT_ILLNESS/INJURY: YES
DRESSING/BATHING_DIFFICULTY: NO

## 2023-07-21 NOTE — ED NOTES
Pt tolerated reduction and sedation well. No complications. L knee immobilizer placed by Ortho. CMS intact. Cap Refill less than 3 sec. Pulses palpable. Pt reports improved pain and stability of the L leg.

## 2023-07-21 NOTE — PROGRESS NOTES
Orthopedic Compartment Check   Time 12:45 Date7/21 Extremity: LLE     Subjective: Seen in PACU post op. Awake and alert. Doing well. Feels pain is well controlled.     Objective:   LLE:  Ex Fix in place. Dressing c/d/i.    Anterior compartment soft and compressible. Lateral compartment soft and compressible. Posterior compartments soft and compressible.   No pain with passive stretch of TA, GSC, EHL, FHL, peroneals.   Toes wwp, capillary refill <3 seconds. DP faint but comparable to contralateral limb. PT 2+, brisk.   SILT in dp/sp/mari/sa/t and symmetric to contralateral side.   Fires GSC, FHL with 5/5 strength. Fires TA and EHL with 4/5 strength.     Assessment and Plan:   No concern for compartment syndrome at this time.     Next exam at approximately 14:30    Houston Upton MD

## 2023-07-21 NOTE — PROGRESS NOTES
Ortho Update    Fracture has shifted in ex fix.   Will plan for RTOR on 7/22 for ex fix adjustment.  To be discussed with patient at bedside in at next compartment check.    Plan:  - NPO at midnight  - will start lovenox tonight - one dose and hold at midnight. Start  tomorrow postop  - consent to be obtained    Staff, Dr. Moe and Dr. Amaya, updated.    Dominga Silva MD, PGY-4  Orthopedics  Pager: [phone]

## 2023-07-21 NOTE — ED NOTES
ED brief signout note    Patient received in signout from Dr. Alvarado, was awaiting transfer to [facility] for orthopedic cares related to a knee fracture/dislocation.    Received updated recommendation from orthopedics, who are now able to accept the patient directly to preop on the [facility].  Transfer changed from [facility] to [facility] as destination, [facility] updated.      --  Raymond Goodson MD  Emergency Medicine           Raymond Goodson MD  07/21/23 3061

## 2023-07-21 NOTE — ANESTHESIA CARE TRANSFER NOTE
Patient: Jean-Paul Carver    Procedure: Procedure(s):  Apply External Fixator Left Lower Extremity       Diagnosis: Tibial plateau fracture [S82.143A]  Diagnosis Additional Information: No value filed.    Anesthesia Type:   General     Note:    Oropharynx: oropharynx clear of all foreign objects and spontaneously breathing  Level of Consciousness: awake  Oxygen Supplementation: face mask  Level of Supplemental Oxygen (L/min / FiO2): 5  Independent Airway: airway patency satisfactory and stable  Dentition: dentition unchanged  Vital Signs Stable: post-procedure vital signs reviewed and stable  Report to RN Given: handoff report given  Patient transferred to: PACU  Comments: 133/73, HR 98, sat 100%, RR 12, T 36.9  Handoff Report: Identifed the Patient, Identified the Reponsible Provider, Reviewed the pertinent medical history, Discussed the surgical course, Reviewed Intra-OP anesthesia mangement and issues during anesthesia, Set expectations for post-procedure period and Allowed opportunity for questions and acknowledgement of understanding      Vitals:  Vitals Value Taken Time   /75 07/21/23 1215   Temp     Pulse 88 07/21/23 1218   Resp 7 07/21/23 1218   SpO2 100 % 07/21/23 1218   Vitals shown include unvalidated device data.    Electronically Signed By: GAEL Bah CRNA  July 21, 2023  12:19 PM

## 2023-07-21 NOTE — PROGRESS NOTES
Ortho Compartment Check    Patient seen at bedside for compartment check. Resting comfortably. Parents at bedside. Rates pain 4/10, not worse than last check. No new pain meds since last compartment check. Denies new numbness/tingling. Feels the PFS is still helping, made to his room without issue. S/p CT.    Interested in eating, parents obtaining food.    Exam (LLE):  -Anterior compartment: soft and compressible  -Lateral compartment: soft and compressible  -Posterior compartments: soft and compressible    No pain with passive stretch of TA, EHL, FHL, GSC.  Fires TA, GSC, EHL, FHL, peroneals with 5/5 strength  SILT to SP/DP/saphenous/sural/tibial nerves, symmetric bilaterally  Palpable 2+ PT pulses, DP faint but comparable to contralateral limb    TAFO/PFS in place    No concern for compartment syndrome at this time. Will continue to monitor.    Next exam: 21:00    Please do not hesitate to reach out if patient becomes more painful or has increasing pain medicine requirements.    --  Gregor Coburn MD, PhD  Orthopedic Surgery PGY-1  [phone]    Please page me directly with any questions/concerns during regular weekday hours before 5pm. If there is no response, if it is a weekend, or if it is during evening hours, then please page the orthopedic surgery resident on call.

## 2023-07-21 NOTE — PROGRESS NOTES
Brief Orthopaedic Note:     Patient sleeping at time of evaluation. Woke up to light voice. Does not feel pain has markedly worsened. Slight parasthesia over dorsum of great toe but sensation symmetric to light touch over tibial, saphenous, sp, dp, sural nerve distributions when compared to contralateral side.     KI in place, able to loosen slightly and feel anterior, lateral, posterior compartments which are soft and compressible. Intact DP and posterior tib pulse. Able to fire EHL, FHL, TA, gsc. No pain with passive stretch of toes.     No concern for compartment syndrome at this time. Again iterated to the patient that this is not a time to be stoic and that he needs to alert staff if his pain acutely worsens. Patient expressed his understanding and agreement.     Will plan for recheck of compartments in 3 hrs if patient is still in house prior to transfer to [facility] for external fixation and prompt OR.    Selena Rodney MD  PGY- 2 Orthopaedic Surgery  Pager: [phone]

## 2023-07-21 NOTE — BRIEF OP NOTE
Brief Operative Note    Preop Dx:   Tibial plateau fracture [S82.143A]  Post op Dx:   Same  Procedure:    Procedure(s):  Apply External Fixator Left Lower Extremity  Surgeon:     Jamel Moe MD   Assistants:    Sam Figueroa MD   Anesthesia:   General  EBL:    10mL  Total IV Fluids:  See Anesthesia Record  Specimens:   None  Findings:   See Operative Dictation      Assessment and Plan: Jean-Paul Carver is a 27 year old male with Schatzker IV tibial plateau fracture-dislocation now s/p External Fixation on 7/21/2023 with Dr. Moe.     Ortho Primary  Activity: Keep LLE elevated on 3 pillows.   Weight bearing status: NWB LLE  Pain management:   Transition from IV to PO narcotics as tolerated. No NSAIDs   Antibiotics: Ancef x 24  hours.  Diet: Begin with clear fluids and progress diet as tolerated   DVT prophylaxis: ASA 162mg  Imaging: None  Labs: Hgb POD 1  Bracing/Splinting: None.  Dressings: Change at discretion of orthopedics  Drains: None  Dodson catheter: Remove POD#1.   Physical Therapy/Occupational Therapy: Eval and treat  Cultures: none.    Consults: Hospitalist  Follow-up: Pending  Disposition: Pending progress with therapies, pain control on orals, and medical stability    Indications for assistant:  I assisted in positioning, exposure, retraction, instrumentation, hemostasis, and wound closure allowing for reduction in anesthesia time and blood loss.     Sam Figueroa MD  Orthopedic Surgery PGY1  [phone]    Please page me  with any questions/concerns during regular weekday hours before 4pm. If there is no response, if it is a weekend, or if it is during evening hours then please page the orthopaedic surgery resident on call.

## 2023-07-21 NOTE — OR NURSING
Pt rating pain at 6/10 states pain is tolerable - oral med given with apple sauce will continue to monitor pt
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
## 2023-07-21 NOTE — ANESTHESIA PREPROCEDURE EVALUATION
Anesthesia Pre-Procedure Evaluation    Patient: Jean-Paul Carver   MRN: [id number] : 1995        Procedure : Procedure(s):  Apply External Fixator Left Lower Extremity          History reviewed. No pertinent past medical history.   History reviewed. No pertinent surgical history.   Allergies   Allergen Reactions     Amoxicillin Other (See Comments)     Baby-mom does not remember exactly the reaction that he had.      Social History     Tobacco Use     Smoking status: Never     Smokeless tobacco: Never   Substance Use Topics     Alcohol use: Yes     Comment: Alcoholic Drinks/day: occasional      Wt Readings from Last 1 Encounters:   23 80 kg (176 lb 5.9 oz)        Anesthesia Evaluation            ROS/MED HX  ENT/Pulmonary:       Neurologic:       Cardiovascular:       METS/Exercise Tolerance:     Hematologic:       Musculoskeletal: Comment: Left tibial plateau fx  (+) fracture, Fracture location: LLE,     GI/Hepatic:       Renal/Genitourinary:       Endo:       Psychiatric/Substance Use:       Infectious Disease:       Malignancy:       Other:               OUTSIDE LABS:  CBC:   Lab Results   Component Value Date    WBC 8.0 2023    HGB 15.8 2023    HGB 15.4 2019    HCT 45.2 2023     2023     BMP:   Lab Results   Component Value Date     2023    POTASSIUM 3.4 2023    CHLORIDE 103 2023    CO2 23 2023    BUN 12.1 2023    CR 1.10 2023     (H) 2023     COAGS:   Lab Results   Component Value Date    PTT 27 2023    INR 1.06 2023     POC: No results found for: BGM, HCG, HCGS  HEPATIC: No results found for: ALBUMIN, PROTTOTAL, ALT, AST, GGT, ALKPHOS, BILITOTAL, BILIDIRECT, DIANE  OTHER:   Lab Results   Component Value Date    GARETT 9.4 2023       Anesthesia Plan    ASA Status:  1      Anesthesia Type: General.     - Airway: LMA   Induction: Intravenous.   Maintenance: Inhalation.        Consents             Postoperative Care    Pain management: IV analgesics, Multi-modal analgesia, Oral pain medications.   PONV prophylaxis: Ondansetron (or other 5HT-3), Dexamethasone or Solumedrol     Comments:                SALLY TSAI MD

## 2023-07-21 NOTE — PROGRESS NOTES
Brief Orthopaedic Update:     Patient will instead transfer directly to preop at [facility] for transfer and go direct to the OR for external fixation with Dr Moe. Preop labs ordered.    Patient will need consent obtained in preop. Preop orders complete and case request placed.    Selena Rodney MD   July 21, 2023, 7:21 AM

## 2023-07-21 NOTE — ANESTHESIA POSTPROCEDURE EVALUATION
Patient: Jean-Paul Carver    Procedure: Procedure(s):  Apply External Fixator Left Lower Extremity       Anesthesia Type:  General    Note:  Disposition: Inpatient   Postop Pain Control: Uneventful            Sign Out: Well controlled pain   PONV: No   Neuro/Psych: Uneventful            Sign Out: Acceptable/Baseline neuro status   Airway/Respiratory: Uneventful            Sign Out: Acceptable/Baseline resp. status   CV/Hemodynamics: Uneventful            Sign Out: Acceptable CV status; No obvious hypovolemia; No obvious fluid overload   Other NRE: NONE   DID A NON-ROUTINE EVENT OCCUR? No           Last vitals:  Vitals Value Taken Time   /71 07/21/23 1345   Temp     Pulse 61 07/21/23 1356   Resp 15 07/21/23 1356   SpO2 99 % 07/21/23 1356   Vitals shown include unvalidated device data.    Electronically Signed By: SALLY TSAI MD  July 21, 2023  1:57 PM
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
## 2023-07-21 NOTE — PROGRESS NOTES
Ortho Compartment Check    Patient seen at bedside for compartment check. Resting comfortably. Parents at bedside. Rates pain 6/10, not worse than last check. Has received the following for pain since last compartment check: 0.2mg hydromorphone (baseline dose) and acetaminophen 975mg. Denies new numbness/tingling. Feels the PFO splint has improved comfort.    Exam (LLE):  -Anterior compartment: soft and compressible  -Lateral compartment: soft and compressible  -Posterior compartments: soft and compressible    No pain with passive stretch of TA, EHL, FHL, GSC.  Fires TA, GSC, EHL, FHL, peroneals with 5/5 strength  SILT to SP/DP/saphenous/sural/tibial nerves, symmetric bilaterally  Palpable 2+ PT pulses, DP faint but comparable to contralateral limb    TAFO/PFS in place    No concern for compartment syndrome at this time. Will continue to monitor.    Next exam: 17:00    Please do not hesitate to reach out if patient becomes more painful or has increasing pain medicine requirements.    --  Gregor Coburn MD, PhD  Orthopedic Surgery PGY-1  [phone]    Please page me directly with any questions/concerns during regular weekday hours before 5pm. If there is no response, if it is a weekend, or if it is during evening hours, then please page the orthopedic surgery resident on call.

## 2023-07-21 NOTE — PHARMACY-ADMISSION MEDICATION HISTORY
Pharmacist Admission Medication History    Admission medication history is complete. The information provided in this note is only as accurate as the sources available at the time of the update.    Medication reconciliation/reorder completed by provider prior to medication history? Yes    Information Source(s): Patient via in-person    Pertinent Information: none    Changes made to PTA medication list:    Added: APAP prn, MVI, vit D, vit B12, vegetable powder    Deleted: None    Changed: None    Allergies reviewed with patient and updates made in EHR: by RN     Medication History Completed By: Henry Leon RPH 7/21/2023 6:26 PM    Prior to Admission medications    Medication Sig Last Dose Taking? Auth Provider Long Term End Date   acetaminophen (TYLENOL) 325 MG tablet Take 325-650 mg by mouth every 6 hours as needed for mild pain or fever Unknown at prn Yes Unknown, Entered By History     cyanocobalamin (VITAMIN B-12) 100 MCG tablet Take 100 mcg by mouth daily Past Week Yes Unknown, Entered By History     ibuprofen (ADVIL/MOTRIN) 200 MG tablet Take 200 mg by mouth every 4 hours as needed for fever or inflammatory pain Unknown at prn Yes Unknown, Entered By History     multivitamin w/minerals (THERA-VIT-M) tablet Take 1 tablet by mouth daily Past Week Yes Unknown, Entered By History     UNABLE TO FIND Take by mouth daily MEDICATION NAME: vegetable powder (similar to Athletic Greens) for dietary support (not protein powder) Past Week Yes Unknown, Entered By History     Vitamin D3 (VITAMIN D, CHOLECALCIFEROL,) 25 mcg (1000 units) tablet Take 25 mcg by mouth daily Past Week Yes Unknown, Entered By History

## 2023-07-21 NOTE — OR NURSING
Pt to pre op via EMS - clothes cut as pt was uncomfortable with movement of leg - Full body bobby wipe completed unable to shave pt's knee due to pain. Parents at bedside , consent done on paper

## 2023-07-21 NOTE — OR NURSING
PACU to Inpatient Nursing Handoff    Patient Jean-Paul Carver is a 27 year old male who speaks English.   Procedure Procedure(s):  Apply External Fixator Left Lower Extremity   Surgeon(s) Primary: Jamel Moe MD  Resident - Assisting: Rene Coburn MD; Houston Upton MD; Sam Figueroa MD     Allergies   Allergen Reactions     Amoxicillin Other (See Comments)     Baby-mom does not remember exactly the reaction that he had.       Isolation  No active isolations     Past Medical History   has no past medical history on file.    Anesthesia General   Dermatome Level     Preop Meds Not applicable   Nerve block Not applicable   Intraop Meds dexamethasone (Decadron)  fentanyl (Sublimaze): 100 mcg total  hydromorphone (Dilaudid): 0.5 mg total  ondansetron (Zofran): last given at 1147   Local Meds No   Antibiotics cefazolin (Ancef) - last given at 1032     Pain Patient Currently in Pain: yes   PACU meds  acetaminophen (Tylenol): 975 mg (total dose) last given at 1358   hydromorphone (Dilaudid): 1.6 mg (total dose) last given at 1302   hydroxizine (Vistaril/Atarax): 25 mg (total dose) last given at 1358   ketorolac (Toradol): 15 mg last given at 1249  oxycodone (Roxicodone): 5 mg (total dose) last given at 1330    PCA / epidural No   Capnography Respiratory Monitoring (EtCO2): 0 mmHg   Telemetry ECG Rhythm: Normal sinus rhythm   Inpatient Telemetry Monitor Ordered? No        Labs Glucose Lab Results   Component Value Date     07/20/2023       Hgb Lab Results   Component Value Date    HGB 15.8 07/20/2023       INR Lab Results   Component Value Date    INR 1.06 07/20/2023      PACU Imaging Completed     Wound/Incision Incision/Surgical Site 07/21/23 Left Knee (Active)   Incision Assessment UTV 07/21/23 1359   Virgie-Incision Assessment UTV 07/21/23 1359   Closure MARLI 07/21/23 1359   Incision Drainage Amount UTV 07/21/23 1359   Dressing Intervention Clean, dry, intact 07/21/23 1359   Number  of days: 0      CMS        Equipment ice pack   Other LDA       IV Access Peripheral IV 07/21/23 Anterior;Left Hand (Active)   Number of days: 0      Blood Products Not applicable EBL 10 mL   Intake/Output Date 07/21/23 0700 - 07/22/23 0659   Shift 1112-2450 3280-2013 0861-9558 24 Hour Total   INTAKE   P.O. 150   150   I.V. 1250   1250   Shift Total(mL/kg) 1400(17.5)   1400(17.5)   OUTPUT   Blood 10   10   Shift Total(mL/kg) 10(0.13)   10(0.13)   Weight (kg) 80 80 80 80      Drains / [place]     Time of void PreOp Time of Void Prior to Procedure: 0600 (07/21/23 0843)    PostOp  No urge to void yet      Diapered? No   Bladder Scan      mL (07/21/23 1359)  water and apple sauce     Vitals    B/P: 119/71  T: 98.6  F (37  C)    Temp src: Axillary  P:  Pulse: 58 (07/21/23 1400)          R: 13  O2:  SpO2: 99 %    O2 Device: Nasal cannula (07/21/23 1400)    Oxygen Delivery: 2 LPM (07/21/23 1400)         Family/support present mother and father   Patient belongings     Patient transported on bed   DC meds/scripts (obs/outpt) Not applicable   Inpatient Pain Meds Released? Yes       Special needs/considerations None   Tasks needing completion None       Denise Grimes, RN  ASCOM [id number]

## 2023-07-21 NOTE — PROGRESS NOTES
Ortho Compartment Check    Patient seen in [facility] pre-op for compartment check. Resting comfortably.  Rates pain 3 out of 10. Has received the following for pain since last compartment check: Dilaudid 1 mg, at baseline for what he has been receiving. Denies new numbness/tingling.  Endorses continued numbness that he noted yesterday after injury.    On exam, compartments full but compressible. No pain with passive stretch of TA, EHL, FHL, GSC, peroneals. 5/5 strength to TA, GSC, EHL, FHL, peroneals. SILT to SP/DP/saphenous/sural/tibial nerves. Palpable DP/PT pulses.    No concern for compartment syndrome at this time. Will continue to monitor.    Jean-Paul will undergo external fixation with Dr. Moe later this morning.    This patient was examined, evaluated, and discussed with senior resident Dr. Dominga Silva.    --  Gregor Coburn MD, PhD  Orthopedic Surgery PGY-1  [phone]    Please page me directly with any questions/concerns during regular weekday hours before 5pm. If there is no response, if it is a weekend, or if it is during evening hours, then please page the orthopedic surgery resident on call.

## 2023-07-21 NOTE — PROGRESS NOTES
S: Pt seen at [facility] PACU R-20 post surgery for external fixator. O: I see the EPIC Order for the PFS splint to LT due to post surgery. A: He was fit with a Medium TAFO/PFS splint and instructions were given and seemed to be understood by his parents. P: FU PRN. G: The goal is to dorsiflex the foot during healing.  Electronically signed Timbo Perez CPO, LPO.

## 2023-07-21 NOTE — ED NOTES
L Posterior tibial pulse present with doppler. L Dorsalis Pedis palpable. CMS intact. Cap refill less than 3 sec.

## 2023-07-21 NOTE — ANESTHESIA PROCEDURE NOTES
Airway       Patient location during procedure: OR  Staff -        CRNA: Eri Fenton APRN CRNA       Performed By: CRNA  Consent for Airway        Urgency: elective  Indications and Patient Condition       Indications for airway management: louie-procedural       Induction type:intravenous       Mask difficulty assessment: 1 - vent by mask    Final Airway Details       Final airway type: supraglottic airway    Supraglottic Airway Details        Type: LMA       Brand: Air-Q       LMA size: 4.5    Post intubation assessment        Placement verified by: capnometry, equal breath sounds and chest rise        Number of attempts at approach: 1       Number of other approaches attempted: 0       Secured with: silk tape       Ease of procedure: easy       Dentition: Intact and Unchanged
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
## 2023-07-21 NOTE — PROGRESS NOTES
Brief Orthopaedic Note:     Patient seen for another compartment check prior to [place] [facility]- estimated time 8517-6776. Feels pain and swelling has slightly worsened. Slight tingling over dorsum of foot but symmetric sensation to light touch over tibial, saphenous, sp, dp, sural nerve distributions when compared to contralateral side.     KI in place, able to loosen and feel anterior, lateral, posterior compartments which remain soft and compressible. Intact DP and posterior tib pulse. Able to fire EHL, FHL, TA, gsc. No increase pain with passive stretch of toes/foot.      No concern for compartment syndrome at this time.  Will plan for recheck of compartments in 3 hrs if patient is still in house prior to [place] [facility].    Please do not hesitate to reach out to orthopaedics on call with any questions or concerns about evolving exam, worsening pain.     Selena Rodney MD  PGY- 2 Orthopaedic Surgery

## 2023-07-22 ENCOUNTER — ANESTHESIA (OUTPATIENT)
Dept: SURGERY | Facility: CLINIC | Age: 28
End: 2023-07-22
Payer: COMMERCIAL

## 2023-07-22 ENCOUNTER — APPOINTMENT (OUTPATIENT)
Dept: GENERAL RADIOLOGY | Facility: CLINIC | Age: 28
End: 2023-07-22
Payer: COMMERCIAL

## 2023-07-22 ENCOUNTER — APPOINTMENT (OUTPATIENT)
Dept: GENERAL RADIOLOGY | Facility: CLINIC | Age: 28
End: 2023-07-22
Attending: ORTHOPAEDIC SURGERY
Payer: COMMERCIAL

## 2023-07-22 LAB
CREAT SERPL-MCNC: 1.07 MG/DL (ref 0.67–1.17)
GFR SERPL CREATININE-BSD FRML MDRD: >90 ML/MIN/1.73M2
HGB BLD-MCNC: 13.2 G/DL (ref 13.3–17.7)

## 2023-07-22 PROCEDURE — 250N000013 HC RX MED GY IP 250 OP 250 PS 637

## 2023-07-22 PROCEDURE — 250N000009 HC RX 250: Performed by: NURSE ANESTHETIST, CERTIFIED REGISTERED

## 2023-07-22 PROCEDURE — 250N000011 HC RX IP 250 OP 636: Mod: JZ | Performed by: ORTHOPAEDIC SURGERY

## 2023-07-22 PROCEDURE — 710N000010 HC RECOVERY PHASE 1, LEVEL 2, PER MIN: Performed by: ORTHOPAEDIC SURGERY

## 2023-07-22 PROCEDURE — 0QSH35Z REPOSITION LEFT TIBIA WITH EXTERNAL FIXATION DEVICE, PERCUTANEOUS APPROACH: ICD-10-PCS | Performed by: ORTHOPAEDIC SURGERY

## 2023-07-22 PROCEDURE — 82565 ASSAY OF CREATININE: CPT | Performed by: ORTHOPAEDIC SURGERY

## 2023-07-22 PROCEDURE — 250N000011 HC RX IP 250 OP 636: Mod: JZ

## 2023-07-22 PROCEDURE — 258N000003 HC RX IP 258 OP 636

## 2023-07-22 PROCEDURE — 250N000025 HC SEVOFLURANE, PER MIN: Performed by: ORTHOPAEDIC SURGERY

## 2023-07-22 PROCEDURE — 258N000003 HC RX IP 258 OP 636: Performed by: NURSE ANESTHETIST, CERTIFIED REGISTERED

## 2023-07-22 PROCEDURE — 20693 ADJMT/REVJ EXT FIXJ SYS ANES: CPT | Mod: 78 | Performed by: ORTHOPAEDIC SURGERY

## 2023-07-22 PROCEDURE — 120N000002 HC R&B MED SURG/OB UMMC

## 2023-07-22 PROCEDURE — 250N000011 HC RX IP 250 OP 636: Mod: JZ | Performed by: STUDENT IN AN ORGANIZED HEALTH CARE EDUCATION/TRAINING PROGRAM

## 2023-07-22 PROCEDURE — 73560 X-RAY EXAM OF KNEE 1 OR 2: CPT | Mod: LT

## 2023-07-22 PROCEDURE — 999N000141 HC STATISTIC PRE-PROCEDURE NURSING ASSESSMENT: Performed by: ORTHOPAEDIC SURGERY

## 2023-07-22 PROCEDURE — 999N000065 XR KNEE PORT LEFT 1/2 VIEWS

## 2023-07-22 PROCEDURE — 360N000081 HC SURGERY LEVEL 1 W/ FLUORO, PER MIN: Performed by: ORTHOPAEDIC SURGERY

## 2023-07-22 PROCEDURE — 370N000017 HC ANESTHESIA TECHNICAL FEE, PER MIN: Performed by: ORTHOPAEDIC SURGERY

## 2023-07-22 PROCEDURE — 85018 HEMOGLOBIN: CPT

## 2023-07-22 PROCEDURE — 36415 COLL VENOUS BLD VENIPUNCTURE: CPT | Performed by: ORTHOPAEDIC SURGERY

## 2023-07-22 PROCEDURE — 250N000011 HC RX IP 250 OP 636: Performed by: NURSE ANESTHETIST, CERTIFIED REGISTERED

## 2023-07-22 PROCEDURE — 999N000180 XR SURGERY CARM FLUORO LESS THAN 5 MIN: Mod: TC

## 2023-07-22 RX ORDER — ONDANSETRON 2 MG/ML
4 INJECTION INTRAMUSCULAR; INTRAVENOUS EVERY 30 MIN PRN
Status: DISCONTINUED | OUTPATIENT
Start: 2023-07-22 | End: 2023-07-22 | Stop reason: HOSPADM

## 2023-07-22 RX ORDER — HYDRALAZINE HYDROCHLORIDE 20 MG/ML
2.5-5 INJECTION INTRAMUSCULAR; INTRAVENOUS EVERY 10 MIN PRN
Status: DISCONTINUED | OUTPATIENT
Start: 2023-07-22 | End: 2023-07-22 | Stop reason: HOSPADM

## 2023-07-22 RX ORDER — HYDROMORPHONE HYDROCHLORIDE 1 MG/ML
0.2 INJECTION, SOLUTION INTRAMUSCULAR; INTRAVENOUS; SUBCUTANEOUS EVERY 5 MIN PRN
Status: DISCONTINUED | OUTPATIENT
Start: 2023-07-22 | End: 2023-07-22 | Stop reason: HOSPADM

## 2023-07-22 RX ORDER — SODIUM CHLORIDE, SODIUM LACTATE, POTASSIUM CHLORIDE, CALCIUM CHLORIDE 600; 310; 30; 20 MG/100ML; MG/100ML; MG/100ML; MG/100ML
INJECTION, SOLUTION INTRAVENOUS CONTINUOUS PRN
Status: DISCONTINUED | OUTPATIENT
Start: 2023-07-22 | End: 2023-07-22

## 2023-07-22 RX ORDER — PROPOFOL 10 MG/ML
INJECTION, EMULSION INTRAVENOUS PRN
Status: DISCONTINUED | OUTPATIENT
Start: 2023-07-22 | End: 2023-07-22

## 2023-07-22 RX ORDER — KETOROLAC TROMETHAMINE 30 MG/ML
15 INJECTION, SOLUTION INTRAMUSCULAR; INTRAVENOUS
Status: DISCONTINUED | OUTPATIENT
Start: 2023-07-22 | End: 2023-07-22 | Stop reason: HOSPADM

## 2023-07-22 RX ORDER — OXYCODONE HYDROCHLORIDE 5 MG/1
5-10 TABLET ORAL EVERY 6 HOURS PRN
Qty: 30 TABLET | Refills: 0 | Status: SHIPPED | OUTPATIENT
Start: 2023-07-22 | End: 2023-07-25

## 2023-07-22 RX ORDER — ALBUTEROL SULFATE 0.83 MG/ML
2.5 SOLUTION RESPIRATORY (INHALATION) EVERY 4 HOURS PRN
Status: DISCONTINUED | OUTPATIENT
Start: 2023-07-22 | End: 2023-07-22 | Stop reason: HOSPADM

## 2023-07-22 RX ORDER — SODIUM CHLORIDE, SODIUM LACTATE, POTASSIUM CHLORIDE, CALCIUM CHLORIDE 600; 310; 30; 20 MG/100ML; MG/100ML; MG/100ML; MG/100ML
INJECTION, SOLUTION INTRAVENOUS CONTINUOUS
Status: DISCONTINUED | OUTPATIENT
Start: 2023-07-22 | End: 2023-07-22 | Stop reason: HOSPADM

## 2023-07-22 RX ORDER — LIDOCAINE HYDROCHLORIDE 20 MG/ML
INJECTION, SOLUTION INFILTRATION; PERINEURAL PRN
Status: DISCONTINUED | OUTPATIENT
Start: 2023-07-22 | End: 2023-07-22

## 2023-07-22 RX ORDER — ACETAMINOPHEN 325 MG/1
975 TABLET ORAL ONCE
Status: CANCELLED | OUTPATIENT
Start: 2023-07-22 | End: 2023-07-22

## 2023-07-22 RX ORDER — ASPIRIN 81 MG/1
162 TABLET ORAL 2 TIMES DAILY
Qty: 60 TABLET | Refills: 0 | Status: SHIPPED | OUTPATIENT
Start: 2023-07-22

## 2023-07-22 RX ORDER — FENTANYL CITRATE 50 UG/ML
25 INJECTION, SOLUTION INTRAMUSCULAR; INTRAVENOUS EVERY 5 MIN PRN
Status: DISCONTINUED | OUTPATIENT
Start: 2023-07-22 | End: 2023-07-22 | Stop reason: HOSPADM

## 2023-07-22 RX ORDER — FENTANYL CITRATE 50 UG/ML
50 INJECTION, SOLUTION INTRAMUSCULAR; INTRAVENOUS EVERY 5 MIN PRN
Status: DISCONTINUED | OUTPATIENT
Start: 2023-07-22 | End: 2023-07-22 | Stop reason: HOSPADM

## 2023-07-22 RX ORDER — HALOPERIDOL 5 MG/ML
1 INJECTION INTRAMUSCULAR
Status: DISCONTINUED | OUTPATIENT
Start: 2023-07-22 | End: 2023-07-22 | Stop reason: HOSPADM

## 2023-07-22 RX ORDER — AMOXICILLIN 250 MG
1-2 CAPSULE ORAL 2 TIMES DAILY
Qty: 30 TABLET | Refills: 0 | Status: SHIPPED | OUTPATIENT
Start: 2023-07-22

## 2023-07-22 RX ORDER — LIDOCAINE 40 MG/G
CREAM TOPICAL
Status: CANCELLED | OUTPATIENT
Start: 2023-07-22

## 2023-07-22 RX ORDER — FENTANYL CITRATE 50 UG/ML
INJECTION, SOLUTION INTRAMUSCULAR; INTRAVENOUS PRN
Status: DISCONTINUED | OUTPATIENT
Start: 2023-07-22 | End: 2023-07-22

## 2023-07-22 RX ORDER — METHOCARBAMOL 500 MG/1
500 TABLET, FILM COATED ORAL 4 TIMES DAILY PRN
Qty: 30 TABLET | Refills: 0 | Status: SHIPPED | OUTPATIENT
Start: 2023-07-22

## 2023-07-22 RX ORDER — ONDANSETRON 2 MG/ML
4 INJECTION INTRAMUSCULAR; INTRAVENOUS EVERY 30 MIN PRN
Status: CANCELLED | OUTPATIENT
Start: 2023-07-22

## 2023-07-22 RX ORDER — HYDROXYZINE HYDROCHLORIDE 25 MG/1
25 TABLET, FILM COATED ORAL EVERY 6 HOURS PRN
Status: DISCONTINUED | OUTPATIENT
Start: 2023-07-22 | End: 2023-07-22 | Stop reason: HOSPADM

## 2023-07-22 RX ORDER — OXYCODONE HYDROCHLORIDE 10 MG/1
10 TABLET ORAL
Status: CANCELLED | OUTPATIENT
Start: 2023-07-22

## 2023-07-22 RX ORDER — OXYCODONE HYDROCHLORIDE 5 MG/1
5 TABLET ORAL
Status: CANCELLED | OUTPATIENT
Start: 2023-07-22

## 2023-07-22 RX ORDER — DIMENHYDRINATE 50 MG/ML
25 INJECTION, SOLUTION INTRAMUSCULAR; INTRAVENOUS
Status: DISCONTINUED | OUTPATIENT
Start: 2023-07-22 | End: 2023-07-22 | Stop reason: HOSPADM

## 2023-07-22 RX ORDER — DEXAMETHASONE SODIUM PHOSPHATE 4 MG/ML
4 INJECTION, SOLUTION INTRA-ARTICULAR; INTRALESIONAL; INTRAMUSCULAR; INTRAVENOUS; SOFT TISSUE
Status: DISCONTINUED | OUTPATIENT
Start: 2023-07-22 | End: 2023-07-22 | Stop reason: HOSPADM

## 2023-07-22 RX ORDER — ASPIRIN 81 MG/1
162 TABLET ORAL DAILY
Status: DISCONTINUED | OUTPATIENT
Start: 2023-07-22 | End: 2023-07-24 | Stop reason: HOSPADM

## 2023-07-22 RX ORDER — ONDANSETRON 4 MG/1
4 TABLET, ORALLY DISINTEGRATING ORAL EVERY 30 MIN PRN
Status: CANCELLED | OUTPATIENT
Start: 2023-07-22

## 2023-07-22 RX ORDER — FENTANYL CITRATE 50 UG/ML
25 INJECTION, SOLUTION INTRAMUSCULAR; INTRAVENOUS
Status: CANCELLED | OUTPATIENT
Start: 2023-07-22

## 2023-07-22 RX ORDER — ONDANSETRON 4 MG/1
4 TABLET, ORALLY DISINTEGRATING ORAL EVERY 30 MIN PRN
Status: DISCONTINUED | OUTPATIENT
Start: 2023-07-22 | End: 2023-07-22 | Stop reason: HOSPADM

## 2023-07-22 RX ORDER — DEXAMETHASONE SODIUM PHOSPHATE 4 MG/ML
INJECTION, SOLUTION INTRA-ARTICULAR; INTRALESIONAL; INTRAMUSCULAR; INTRAVENOUS; SOFT TISSUE PRN
Status: DISCONTINUED | OUTPATIENT
Start: 2023-07-22 | End: 2023-07-22

## 2023-07-22 RX ORDER — POLYETHYLENE GLYCOL 3350 17 G/17G
1 POWDER, FOR SOLUTION ORAL DAILY
Qty: 7 PACKET | Refills: 0 | Status: SHIPPED | OUTPATIENT
Start: 2023-07-22

## 2023-07-22 RX ORDER — MEPERIDINE HYDROCHLORIDE 25 MG/ML
12.5 INJECTION INTRAMUSCULAR; INTRAVENOUS; SUBCUTANEOUS EVERY 5 MIN PRN
Status: DISCONTINUED | OUTPATIENT
Start: 2023-07-22 | End: 2023-07-22 | Stop reason: HOSPADM

## 2023-07-22 RX ORDER — HYDROMORPHONE HYDROCHLORIDE 1 MG/ML
0.4 INJECTION, SOLUTION INTRAMUSCULAR; INTRAVENOUS; SUBCUTANEOUS EVERY 5 MIN PRN
Status: DISCONTINUED | OUTPATIENT
Start: 2023-07-22 | End: 2023-07-22 | Stop reason: HOSPADM

## 2023-07-22 RX ORDER — LORAZEPAM 2 MG/ML
.5-1 INJECTION INTRAMUSCULAR
Status: DISCONTINUED | OUTPATIENT
Start: 2023-07-22 | End: 2023-07-22 | Stop reason: HOSPADM

## 2023-07-22 RX ORDER — LABETALOL HYDROCHLORIDE 5 MG/ML
10 INJECTION, SOLUTION INTRAVENOUS
Status: DISCONTINUED | OUTPATIENT
Start: 2023-07-22 | End: 2023-07-22 | Stop reason: HOSPADM

## 2023-07-22 RX ORDER — ACETAMINOPHEN 325 MG/1
975 TABLET ORAL ONCE
Status: DISCONTINUED | OUTPATIENT
Start: 2023-07-22 | End: 2023-07-22 | Stop reason: HOSPADM

## 2023-07-22 RX ORDER — SODIUM CHLORIDE, SODIUM LACTATE, POTASSIUM CHLORIDE, CALCIUM CHLORIDE 600; 310; 30; 20 MG/100ML; MG/100ML; MG/100ML; MG/100ML
INJECTION, SOLUTION INTRAVENOUS CONTINUOUS
Status: CANCELLED | OUTPATIENT
Start: 2023-07-22

## 2023-07-22 RX ADMIN — SODIUM CHLORIDE, POTASSIUM CHLORIDE, SODIUM LACTATE AND CALCIUM CHLORIDE: 600; 310; 30; 20 INJECTION, SOLUTION INTRAVENOUS at 10:52

## 2023-07-22 RX ADMIN — LIDOCAINE HYDROCHLORIDE 80 MG: 20 INJECTION, SOLUTION INFILTRATION; PERINEURAL at 10:52

## 2023-07-22 RX ADMIN — OXYCODONE HYDROCHLORIDE 10 MG: 10 TABLET ORAL at 22:24

## 2023-07-22 RX ADMIN — SODIUM CHLORIDE, POTASSIUM CHLORIDE, SODIUM LACTATE AND CALCIUM CHLORIDE: 600; 310; 30; 20 INJECTION, SOLUTION INTRAVENOUS at 02:00

## 2023-07-22 RX ADMIN — OXYCODONE HYDROCHLORIDE 10 MG: 10 TABLET ORAL at 14:42

## 2023-07-22 RX ADMIN — HYDROXYZINE HYDROCHLORIDE 25 MG: 25 TABLET, FILM COATED ORAL at 18:15

## 2023-07-22 RX ADMIN — OXYCODONE HYDROCHLORIDE 10 MG: 10 TABLET ORAL at 00:35

## 2023-07-22 RX ADMIN — PROPOFOL 240 MG: 10 INJECTION, EMULSION INTRAVENOUS at 10:52

## 2023-07-22 RX ADMIN — ACETAMINOPHEN 975 MG: 325 TABLET, FILM COATED ORAL at 13:34

## 2023-07-22 RX ADMIN — SENNOSIDES AND DOCUSATE SODIUM 1 TABLET: 50; 8.6 TABLET ORAL at 20:18

## 2023-07-22 RX ADMIN — CEFAZOLIN SODIUM 2 G: 2 INJECTION, SOLUTION INTRAVENOUS at 02:02

## 2023-07-22 RX ADMIN — OXYCODONE HYDROCHLORIDE 10 MG: 10 TABLET ORAL at 18:16

## 2023-07-22 RX ADMIN — OXYCODONE HYDROCHLORIDE 10 MG: 10 TABLET ORAL at 09:42

## 2023-07-22 RX ADMIN — HYDROXYZINE HYDROCHLORIDE 25 MG: 25 TABLET, FILM COATED ORAL at 00:35

## 2023-07-22 RX ADMIN — METHOCARBAMOL 750 MG: 750 TABLET ORAL at 22:24

## 2023-07-22 RX ADMIN — HYDROXYZINE HYDROCHLORIDE 25 MG: 25 TABLET, FILM COATED ORAL at 09:42

## 2023-07-22 RX ADMIN — FENTANYL CITRATE 50 MCG: 50 INJECTION, SOLUTION INTRAMUSCULAR; INTRAVENOUS at 11:15

## 2023-07-22 RX ADMIN — MIDAZOLAM 2 MG: 1 INJECTION INTRAMUSCULAR; INTRAVENOUS at 10:46

## 2023-07-22 RX ADMIN — ACETAMINOPHEN 975 MG: 325 TABLET, FILM COATED ORAL at 04:45

## 2023-07-22 RX ADMIN — HYDROMORPHONE HYDROCHLORIDE 0.4 MG: 1 INJECTION, SOLUTION INTRAMUSCULAR; INTRAVENOUS; SUBCUTANEOUS at 13:29

## 2023-07-22 RX ADMIN — KETOROLAC TROMETHAMINE 15 MG: 30 INJECTION, SOLUTION INTRAMUSCULAR; INTRAVENOUS at 00:35

## 2023-07-22 RX ADMIN — OXYCODONE HYDROCHLORIDE 10 MG: 10 TABLET ORAL at 04:45

## 2023-07-22 RX ADMIN — DEXAMETHASONE SODIUM PHOSPHATE 8 MG: 4 INJECTION, SOLUTION INTRA-ARTICULAR; INTRALESIONAL; INTRAMUSCULAR; INTRAVENOUS; SOFT TISSUE at 10:52

## 2023-07-22 RX ADMIN — ASPIRIN 162 MG: 81 TABLET, COATED ORAL at 20:18

## 2023-07-22 RX ADMIN — ONDANSETRON 4 MG: 2 INJECTION INTRAMUSCULAR; INTRAVENOUS at 11:30

## 2023-07-22 RX ADMIN — ACETAMINOPHEN 975 MG: 325 TABLET, FILM COATED ORAL at 20:17

## 2023-07-22 RX ADMIN — FENTANYL CITRATE 50 MCG: 50 INJECTION, SOLUTION INTRAMUSCULAR; INTRAVENOUS at 10:52

## 2023-07-22 RX ADMIN — KETOROLAC TROMETHAMINE 15 MG: 30 INJECTION, SOLUTION INTRAMUSCULAR; INTRAVENOUS at 06:25

## 2023-07-22 RX ADMIN — METHOCARBAMOL 750 MG: 750 TABLET ORAL at 13:29

## 2023-07-22 ASSESSMENT — ACTIVITIES OF DAILY LIVING (ADL)
ADLS_ACUITY_SCORE: 22

## 2023-07-22 NOTE — PROGRESS NOTES
PACU to Inpatient Nursing Handoff    Patient Jean-Paul Carver is a 27 year old male who speaks English.   Procedure Procedure(s):  Closed Reduction of Tibia, Adjust External Fixator   Surgeon(s) Primary: Nick Amaya MD  Resident - Assisting: Chun Jaffe MD     Allergies   Allergen Reactions     Amoxicillin Other (See Comments)     Baby-mom does not remember exactly the reaction that he had.    Tolerated cefazolin 7/21/23       Isolation  No active isolations     Past Medical History   has no past medical history on file.    Anesthesia General   Dermatome Level     Preop Meds ketorolac (Toradol) - time given: 0630  oxycodone (Oxycontin) - time given: 0942  Atarax  - time given: 0942   Nerve block Not applicable   Intraop Meds dexamethasone (Decadron)  fentanyl (Sublimaze): 100 mcg total  Zofran at 1130   Local Meds Yes   Antibiotics Not applicable     Pain Patient Currently in Pain: yes   PACU meds  Not applicable   PCA / epidural No   Capnography Respiratory Monitoring (EtCO2): 40 mmHg   Telemetry ECG Rhythm: Normal sinus rhythm   Inpatient Telemetry Monitor Ordered? No        Labs Glucose Lab Results   Component Value Date     07/20/2023       Hgb Lab Results   Component Value Date    HGB 13.2 07/22/2023       INR Lab Results   Component Value Date    INR 1.06 07/20/2023      PACU Imaging Not applicable     Wound/Incision Incision/Surgical Site 07/21/23 Left Knee (Active)   Incision Assessment UTV 07/21/23 2244   Virgie-Incision Assessment UTV 07/21/23 1909   Closure MARLI 07/21/23 2244   Incision Drainage Amount UTV 07/21/23 2244   Dressing Intervention Clean, dry, intact 07/21/23 2244   Number of days: 1      CMS        Equipment Not applicable   Other LDA Supraglottic Airway Standard LMA 4.5 Air-Q (Active)   Number of days: 0        IV Access Peripheral IV 07/21/23 Anterior;Left Hand (Active)   Site Assessment WDL 07/21/23 2244   Line Status Infusing 07/21/23 2244   Dressing Status  clean;intact;dry 07/21/23 2244   Phlebitis Scale 0-->no symptoms 07/21/23 2244   Number of days: 1      Blood Products Not applicable EBL 0 mL   Intake/Output Date 07/22/23 0700 - 07/23/23 0659   Shift 1957-3798 0250-5100 7821-4247 24 Hour Total   INTAKE   Shift Total(mL/kg)       OUTPUT   Urine 200   200   Shift Total(mL/kg) 200(2.5)   200(2.5)   Weight (kg) 80 80 80 80      Drains / Dodson     Time of void PreOp Time of Void Prior to Procedure: 1010 (07/22/23 1035)    PostOp Voided (mL): 200 mL (07/22/23 1010)    Diapered? NA   Bladder Scan Bladder Scan Volume (mL): 519 ml (07/21/23 1549)    mL (07/21/23 1359)  tolerating sips and water     Vitals    B/P: 118/66  T: 98.3  F (36.8  C)    Temp src: Oral  P:  Pulse: 85 (07/22/23 0700)          R: 15  O2:  SpO2: 96 %    O2 Device: None (Room air) (07/22/23 0700)    Oxygen Delivery: 2 LPM (07/21/23 1904)         Family/support present mother and father   Patient belongings  In patient's room upstairs   Patient transported on bed   DC meds/scripts (obs/outpt) Not applicable   Inpatient Pain Meds Released? Yes       Special needs/considerations None   Tasks needing completion None       Jesi Perkins RN  ASCOM [id number]

## 2023-07-22 NOTE — PROGRESS NOTES
Ortho Compartment Check    Patient seen at bedside for compartment check. Resting comfortably. Parents at bedside. Pain controlled and no new symptoms since last check. No new pain meds since last compartment check. Denies new numbness/tingling.     Reviewed POC for OR tomorrow.     Informed consent obtained.    One dose heparin tonight for VTE ppx and then hold.    NPO at midnight.    Exam (LLE):  -Anterior compartment: soft and compressible  -Lateral compartment: soft and compressible  -Posterior compartments: soft and compressible    No pain with passive stretch of TA, EHL, FHL, GSC.  Fires TA, GSC, EHL, FHL, peroneals with 5/5 strength  SILT to SP/DP/saphenous/sural/tibial nerves, symmetric bilaterally  Palpable DP and PT pulses  Dressings c/d/i  PFS in place    Low concern for compartment syndrome at this time. Will continue to monitor.    Next exam: 0400    Please do not hesitate to reach out if patient becomes more painful or has increasing pain medicine requirements.      Dominga Silva MD, PGY-4  Orthopedics  Pager: [phone]

## 2023-07-22 NOTE — BRIEF OP NOTE
[facility]    Brief Operative Note    Pre-operative diagnosis: Fracture of left tibial plateau [S82.142A]  Post-operative diagnosis Same as pre-operative diagnosis    Procedure: Procedure(s):  Closed Reduction of Tibia, Adjust External Fixator  Surgeon: Surgeon(s) and Role:     * Nick Amaya MD - Primary     * Chun Jaffe MD - Resident - Assisting  Anesthesia: General   Estimated Blood Loss: None    Drains: None  Specimens: * No specimens in log *  Findings:   None.  Complications: None.  Implants: * No implants in log *    Orthopedic Surgery primary  Activity: Keep LLE elevated   Weight bearing status: NWB LLE  Pain management:   Transition from IV to PO narcotics as tolerated. Okay for NSAIDs  Antibiotics: Ancef pre and postop today  Diet: ADAT  DVT prophylaxis: ASA 162mg  Imaging: PACU   Labs: preop labs complete  Bracing/Splinting: PFS LLE.  Dressings: keep intact until RTOR  Drains: None  Dodson catheter: N/A  Physical Therapy/Occupational Therapy: Eval and treat postop  Disposition: Plan to discharge to home with follow up at [facility]    Chun Jaffe MD  Orthopedic Surgery, PGY-4  Pager: [phone]

## 2023-07-22 NOTE — ANESTHESIA PROCEDURE NOTES
Airway       Patient location during procedure: OR  Staff -        CRNA: Jose C Martinez APRN CRNA       Performed By: CRNA  Consent for Airway        Urgency: elective  Indications and Patient Condition       Indications for airway management: louie-procedural       Induction type:intravenous       Mask difficulty assessment: 1 - vent by mask    Final Airway Details       Final airway type: supraglottic airway    Supraglottic Airway Details        Type: LMA       Brand: Air-Q       LMA size: 4.5    Post intubation assessment        Placement verified by: capnometry        Number of attempts at approach: 1       Secured with: plastic tape       Ease of procedure: easy       Dentition: Intact and Unchanged

## 2023-07-22 NOTE — ANESTHESIA PREPROCEDURE EVALUATION
Anesthesia Pre-Procedure Evaluation    Patient: Jean-Paul Carver   MRN: [id number] : 1995        Procedure : Procedure(s):  Closed Reduction of Tibia, Adjust External Fixator          History reviewed. No pertinent past medical history.   History reviewed. No pertinent surgical history.   Allergies   Allergen Reactions     Amoxicillin Other (See Comments)     Baby-mom does not remember exactly the reaction that he had.    Tolerated cefazolin 23      Social History     Tobacco Use     Smoking status: Never     Smokeless tobacco: Never   Substance Use Topics     Alcohol use: Yes     Comment: Alcoholic Drinks/day: occasional      Wt Readings from Last 1 Encounters:   23 80 kg (176 lb 5.9 oz)        Anesthesia Evaluation            ROS/MED HX  ENT/Pulmonary:       Neurologic:       Cardiovascular:       METS/Exercise Tolerance:     Hematologic:       Musculoskeletal: Comment: Left tibial plateau fx  (+) fracture, Fracture location: LLE,     GI/Hepatic:       Renal/Genitourinary:       Endo:       Psychiatric/Substance Use:       Infectious Disease:       Malignancy:       Other:            Physical Exam    Airway        Mallampati: I   TM distance: > 3 FB   Neck ROM: full   Mouth opening: > 3 cm    Respiratory Devices and Support         Dental       (+) Minor Abnormalities - some fillings, tiny chips      Cardiovascular   cardiovascular exam normal          Pulmonary   pulmonary exam normal                OUTSIDE LABS:  CBC:   Lab Results   Component Value Date    WBC 8.0 2023    HGB 13.2 (L) 2023    HGB 15.8 2023    HCT 45.2 2023     2023     BMP:   Lab Results   Component Value Date     2023    POTASSIUM 3.4 2023    CHLORIDE 103 2023    CO2 23 2023    BUN 12.1 2023    CR 1.07 2023    CR 1.10 2023     (H) 2023     COAGS:   Lab Results   Component Value Date    PTT 27 2023    INR 1.06 2023      POC: No results found for: BGM, HCG, HCGS  HEPATIC: No results found for: ALBUMIN, PROTTOTAL, ALT, AST, GGT, ALKPHOS, BILITOTAL, BILIDIRECT, DIANE  OTHER:   Lab Results   Component Value Date    GARETT 9.4 07/20/2023       Anesthesia Plan    ASA Status:  1   NPO Status:  NPO Appropriate    Anesthesia Type: General.     - Airway: LMA   Induction: Intravenous, Propofol.   Maintenance: Balanced.        Consents    Anesthesia Plan(s) and associated risks, benefits, and realistic alternatives discussed. Questions answered and patient/representative(s) expressed understanding.    - Discussed:     - Discussed with:  Patient, Parent (Mother and/or Father)      - Extended Intubation/Ventilatory Support Discussed: No.      - Patient is DNR/DNI Status: No    Use of blood products discussed: No .     Postoperative Care    Pain management: IV analgesics, Oral pain medications, Multi-modal analgesia.   PONV prophylaxis: Dexamethasone or Solumedrol, Ondansetron (or other 5HT-3), Background Propofol Infusion     Comments:                Flynn Ferrari MD

## 2023-07-22 NOTE — PLAN OF CARE
Patient arrived from PCAU to unit around 4 P.M.  VS: Temp: 98  F (36.7  C) Temp src: Oral BP: 132/66 Pulse: 71   Resp: 18 SpO2: 96 % O2 Device: Nasal cannula Oxygen Delivery: 2 LPM   O2: Stable on 1 L of oxygen, denies SOB and  chest pain, denies difficulty breathing, lung sounds clear  and equal bilaterally.    Output: Voiding spontaneously and adequately   Last BM:  Per pt, last BM 7/20/23, continent of bowel and bladder, urinal at bedside.   Activity: Assist x1-2 with gait belt and walker, pt not OOB this shift.   Skin: Left knee incision   Pain: Pain rated 6/10., managed with scheduled toradol.   Neuro: A & O x4, calm and cooperative, denies N/T   Dressing: Left knee incision dressing -CDI   Diet: Regular diet   LDA: Left PIV infusing LR at 75 ml/hr   Equipment: Personal belongings in room, call light, IV pole, family at bedside.   Plan: Continue with plan of care   Additional Info:

## 2023-07-22 NOTE — ANESTHESIA POSTPROCEDURE EVALUATION
Patient: Jean-Paul Carver    Procedure: Procedure(s):  Closed Reduction of Tibia, Adjust External Fixator       Anesthesia Type:  General    Note:  Disposition: Inpatient   Postop Pain Control: Uneventful            Sign Out: Well controlled pain   PONV: No   Neuro/Psych: Uneventful            Sign Out: Acceptable/Baseline neuro status   Airway/Respiratory: Uneventful            Sign Out: Acceptable/Baseline resp. status   CV/Hemodynamics: Uneventful            Sign Out: Acceptable CV status; No obvious hypovolemia; No obvious fluid overload   Other NRE:    DID A NON-ROUTINE EVENT OCCUR?            Last vitals:  Vitals Value Taken Time   /79 07/22/23 1200   Temp 36.6  C (97.9  F) 07/22/23 1140   Pulse 61 07/22/23 1201   Resp 7 07/22/23 1202   SpO2 99 % 07/22/23 1202   Vitals shown include unvalidated device data.    Electronically Signed By: Flynn Ferrari MD  July 22, 2023  12:03 PM

## 2023-07-22 NOTE — OP NOTE
DATE OF SURGERY: 7/22/2023    PREOPERATIVE DIAGNOSIS: Left tibial plateau fracture    POSTOPERATIVE DIAGNOSIS: Left tibia plateau fracture    PROCEDURE: #1 closed-tibial plateau #2-left leg extensor    SURGEON: Nick Amaya MD     ASSISTANT: Chun Jaffe MD    PATIENT HISTORY: This patient had a knee spanning external fixator placed yesterday for a tibial plateau fracture.  The postoperative x-rays show an unacceptable alignment of the fracture.   I do not know if that happened during or after surgery.  He presents now to undergo closed reduction and adjustment of the fixator.    DESCRIPTION OF PROCEDURE: The patient underwent successful induction of anesthesia.  We examined the leg under C-arm.  I do not see any pins or screws that were grossly loose.  I loosened the bars of the fixator and then we applied traction and a valgus load at the knee.  I was able to get an improved reduction.  I tightened the fixator again.  Next I checked the C-arm images in the AP and the lateral.  I was happy with the overall length and alignment.  I then tightened the entire fixator.  The leg was wrapped again.  The patient was then extubated and taken to the recovery room in stable condition.  There were no complications.  I was present for the entire procedure.    Nick Amaya MD

## 2023-07-22 NOTE — PLAN OF CARE
Goal Outcome Evaluation:                 VS: VSS and afebrile   Decline to use Capno at night   O2: Sats > 92% on RA.  Cont pulse ox in place.   Output: Voids without difficulty    Last BM: 7/20 and passing gas    Activity: Not OOB,  Surgical leg elevated with 3 pillows.    Skin: Surgical incision to L leg- elevated with pillows.   Bruise to R knee   Pain: Managed with Oxycodone, Hydroxyzine and Tylenol   Neuro: Alert and oriented x4.   Denies numbness and tingling    Dressing: Ace bandage to L leg: CDI, external fixator: in place   Diet: NPO after midnight    LDA: L lower FA PIV: infusing    Equipment: PCD, IV pump, ipad,and personal belongings at bedside    Plan: RTOR 7/22   Additional Info:

## 2023-07-22 NOTE — ANESTHESIA CARE TRANSFER NOTE
Patient: Jean-Paul Carver    Procedure: Procedure(s):  Closed Reduction of Tibia, Adjust External Fixator       Diagnosis: Fracture of left tibial plateau [S82.142A]  Diagnosis Additional Information: No value filed.    Anesthesia Type:   No value filed.     Note:    Oropharynx: oropharynx clear of all foreign objects and spontaneously breathing  Level of Consciousness: awake  Oxygen Supplementation: face mask  Level of Supplemental Oxygen (L/min / FiO2): 6  Independent Airway: airway patency satisfactory and stable  Dentition: dentition unchanged  Vital Signs Stable: post-procedure vital signs reviewed and stable  Report to RN Given: handoff report given  Patient transferred to: PACU    Handoff Report: Identifed the Patient, Identified the Reponsible Provider, Reviewed the pertinent medical history, Discussed the surgical course, Reviewed Intra-OP anesthesia mangement and issues during anesthesia, Set expectations for post-procedure period and Allowed opportunity for questions and acknowledgement of understanding      Vitals:  Vitals Value Taken Time   /73    Temp 36.6    Pulse 65    Resp 16    SpO2 99 % 07/22/23 1140   Vitals shown include unvalidated device data.    Electronically Signed By: GAEL Mccarty CRNA  July 22, 2023  11:41 AM

## 2023-07-22 NOTE — PLAN OF CARE
PT: Approached by ortho to see patient today if able for potential discharge home this evening vs tomorrow morning. Went into room and spoke with patient and parents, declines mobility at this time d/t pain. Patient endorsed not being comfortable/ready for discharge home today. Spent significant amount of time however providing education and answering questions for likely d/c tomorrow. Educated on NWB status, safety with mobility with use of crutches vs walker, patient positioning for ride home, raised toilet seat (patient 6'4) and possible tub/shower bench as has step over tub. Also provided limb elevator for increased comfort and improved positioning. Will see tomorrow for formal PT evaluation and subsequent treatment.

## 2023-07-22 NOTE — PROGRESS NOTES
Orthopedic Surgery Progress Note: 07/22/2023    Subjective:   No acute events overnight. Pain controlled. No new n/t since eval at 2130. NPO for OR today. Voiding spontaneously. Reviewed POC for RTOR.    Objective:   /63 (BP Location: Right arm)   Pulse 60   Temp 98  F (36.7  C) (Oral)   Resp 15   Wt 80 kg (176 lb 5.9 oz)   SpO2 97%   BMI 22.04 kg/m    No intake/output data recorded.  General: NAD. Resting comfortably in bed.  Respiratory: Breathing comfortably on RA.  Drain Output: No drain.  Musculoskeletal:    -Anterior compartment: soft and compressible  -Lateral compartment: soft and compressible  -Posterior compartments: soft and compressible     No pain with passive stretch of TA, EHL, FHL, GSC.  Fires TA, GSC, EHL, FHL, peroneals with 5/5 strength  SILT to SP/DP/saphenous/sural/tibial nerves, symmetric bilaterally  Palpable DP and PT pulses  Dressings c/d/i  PFS in place      Laboratory Data:  Lab Results   Component Value Date    WBC 8.0 07/20/2023    HGB 15.8 07/20/2023     07/20/2023    INR 1.06 07/20/2023       Assessment & Plan:   Jean-Paul Carver is a 27 year old male who sustained a left Schatzker IV tibial plateau fracture and is now status post application of ex fix LLE on 7/21 with Dr. Moe.      Unfortunately, fracture has shifted in ex fix. Plan for RTOR on 7/22 with Dr. Amaya for ex fix adjustment.    - NPO  - hold heparin  - labs complete  - informed consent obtained    Orthopedic Surgery primary  Activity: Keep LLE elevated   Weight bearing status: NWB LLE  Pain management:   Transition from IV to PO narcotics as tolerated. Okay for NSAIDs  Antibiotics: Ancef pre and postop today  Diet: NPO for OR today  DVT prophylaxis: hold for OR  Imaging: complete  Labs: preop labs complete  Bracing/Splinting: PFS LLE.  Dressings: keep intact until RTOR  Drains: None  Dodson catheter: N/A  Physical Therapy/Occupational Therapy: Eval and treat postop  Follow-up: TBD, pending RTOR.  May get definitive fixation in 1-2 weeks at OSH  Disposition: Pending RTOR today    ------------------------------------------------------------------------------------------    Dominga Silva MD, PGY-4  Orthopedics  Pager: [phone]

## 2023-07-23 ENCOUNTER — APPOINTMENT (OUTPATIENT)
Dept: PHYSICAL THERAPY | Facility: CLINIC | Age: 28
End: 2023-07-23
Payer: COMMERCIAL

## 2023-07-23 ENCOUNTER — APPOINTMENT (OUTPATIENT)
Dept: OCCUPATIONAL THERAPY | Facility: CLINIC | Age: 28
End: 2023-07-23
Payer: COMMERCIAL

## 2023-07-23 VITALS
RESPIRATION RATE: 16 BRPM | SYSTOLIC BLOOD PRESSURE: 127 MMHG | BODY MASS INDEX: 22.04 KG/M2 | HEART RATE: 58 BPM | WEIGHT: 176.37 LBS | OXYGEN SATURATION: 100 % | TEMPERATURE: 98.4 F | DIASTOLIC BLOOD PRESSURE: 73 MMHG

## 2023-07-23 LAB — HGB BLD-MCNC: 12.1 G/DL (ref 13.3–17.7)

## 2023-07-23 PROCEDURE — 97161 PT EVAL LOW COMPLEX 20 MIN: CPT | Mod: GP

## 2023-07-23 PROCEDURE — 97116 GAIT TRAINING THERAPY: CPT | Mod: GP

## 2023-07-23 PROCEDURE — 250N000013 HC RX MED GY IP 250 OP 250 PS 637

## 2023-07-23 PROCEDURE — 36415 COLL VENOUS BLD VENIPUNCTURE: CPT

## 2023-07-23 PROCEDURE — 97530 THERAPEUTIC ACTIVITIES: CPT | Mod: GP

## 2023-07-23 PROCEDURE — 85018 HEMOGLOBIN: CPT

## 2023-07-23 PROCEDURE — 120N000002 HC R&B MED SURG/OB UMMC

## 2023-07-23 PROCEDURE — 97535 SELF CARE MNGMENT TRAINING: CPT | Mod: GO

## 2023-07-23 PROCEDURE — 97166 OT EVAL MOD COMPLEX 45 MIN: CPT | Mod: GO

## 2023-07-23 RX ADMIN — HYDROXYZINE HYDROCHLORIDE 25 MG: 25 TABLET, FILM COATED ORAL at 16:51

## 2023-07-23 RX ADMIN — ASPIRIN 162 MG: 81 TABLET, COATED ORAL at 08:32

## 2023-07-23 RX ADMIN — OXYCODONE HYDROCHLORIDE 5 MG: 5 TABLET ORAL at 06:36

## 2023-07-23 RX ADMIN — METHOCARBAMOL 750 MG: 750 TABLET ORAL at 04:30

## 2023-07-23 RX ADMIN — POLYETHYLENE GLYCOL 3350 17 G: 17 POWDER, FOR SOLUTION ORAL at 08:32

## 2023-07-23 RX ADMIN — OXYCODONE HYDROCHLORIDE 5 MG: 5 TABLET ORAL at 02:25

## 2023-07-23 RX ADMIN — OXYCODONE HYDROCHLORIDE 10 MG: 10 TABLET ORAL at 16:51

## 2023-07-23 RX ADMIN — SENNOSIDES AND DOCUSATE SODIUM 1 TABLET: 50; 8.6 TABLET ORAL at 08:32

## 2023-07-23 RX ADMIN — ACETAMINOPHEN 975 MG: 325 TABLET, FILM COATED ORAL at 04:30

## 2023-07-23 RX ADMIN — ACETAMINOPHEN 975 MG: 325 TABLET, FILM COATED ORAL at 11:56

## 2023-07-23 RX ADMIN — OXYCODONE HYDROCHLORIDE 10 MG: 10 TABLET ORAL at 11:57

## 2023-07-23 RX ADMIN — HYDROXYZINE HYDROCHLORIDE 25 MG: 25 TABLET, FILM COATED ORAL at 04:30

## 2023-07-23 ASSESSMENT — ACTIVITIES OF DAILY LIVING (ADL)
ADLS_ACUITY_SCORE: 22
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
## 2023-07-23 NOTE — PROGRESS NOTES
07/23/23 1029   Appointment Info   Signing Clinician's Name / Credentials (OT) Laney Leung, OTR/L   Rehab Comments (OT) NWB LLE with PFS while in bed   Living Environment   People in Home alone   Current Living Arrangements house   Home Accessibility stairs to enter home;stairs within home   Number of Stairs, Main Entrance 2;7   Stair Railings, Main Entrance   (2 stairs (R rail) 7 stairs both rails but can only reach one)   Number of Stairs, Within Home, Primary greater than 10 stairs   Stair Railings, Within Home, Primary railings safe and in good condition   Living Environment Comments parents will be staying with pt for a few week. Pt able to live on main level. pt has tub shower. Parents are purchasing RTS and shower chair   Self-Care   Usual Activity Tolerance good   Current Activity Tolerance good   Equipment Currently Used at Home none   Fall history within last six months yes   Number of times patient has fallen within last six months 1   Activity/Exercise/Self-Care Comment ind with ADLs/IADLs   General Information   Onset of Illness/Injury or Date of Surgery 07/20/23   Referring Physician Dr. Moe   Patient/Family Therapy Goal Statement (OT) to get in and out of bed   Additional Occupational Profile Info/Pertinent History of Current Problem per chart,  27 year old male who presented with closed left Schatzker IV tibial plateau fracture-dislocation.  I met and examined the patient in the preoperative area.  He has no concerning signs of compartment syndrome. Given instability associated with this injury pattern he is indicated for spanning external fixation to restore length, alignment and rotation.   Existing Precautions/Restrictions weight bearing   Left Upper Extremity (Weight-bearing Status) full weight-bearing (FWB)   Right Upper Extremity (Weight-bearing Status) full weight-bearing (FWB)   Left Lower Extremity (Weight-bearing Status) non weight-bearing (NWB)   Right Lower Extremity  (Weight-bearing Status) full weight-bearing (FWB)   Cognitive Status Examination   Orientation Status orientation to person, place and time   Affect/Mental Status (Cognitive) WNL   Follows Commands WNL   Visual Perception   Visual Impairment/Limitations WNL   Sensory   Sensory Quick Adds sensation intact   Pain Assessment   Patient Currently in Pain Yes, see Vital Sign flowsheet   Posture   Posture not impaired   Range of Motion Comprehensive   General Range of Motion no range of motion deficits identified   Strength Comprehensive (MMT)   General Manual Muscle Testing (MMT) Assessment no strength deficits identified   Muscle Tone Assessment   Muscle Tone Quick Adds No deficits were identified   Coordination   Upper Extremity Coordination No deficits were identified   Bed Mobility   Bed Mobility supine-sit;sit-supine   Supine-Sit Farrell (Bed Mobility) minimum assist (75% patient effort)   Sit-Supine Farrell (Bed Mobility) minimum assist (75% patient effort)   Transfers   Transfers bed-chair transfer;sit-stand transfer;toilet transfer;shower transfer   Transfer Skill: Bed to Chair/Chair to Bed   Bed-Chair Farrell (Transfers) supervision   Sit-Stand Transfer   Sit-Stand Farrell (Transfers) supervision   Shower Transfer   Farrell Level (Shower Transfer) moderate assist (50% patient effort)   Toilet Transfer   Farrell Level (Toilet Transfer) supervision   Balance   Balance Assessment no deficits were identified   Activities of Daily Living   BADL Assessment/Intervention toileting   Toileting   Farrell Level (Toileting) supervision   Clinical Impression   Criteria for Skilled Therapeutic Interventions Met (OT) Yes, treatment indicated   OT Diagnosis Decreased ADL independence   Influenced by the following impairments L closed reduction of tibia and adjust external fixator; L knee disloaction   OT Problem List-Impairments impacting ADL problems related to;activity tolerance  impaired;post-surgical precautions   Assessment of Occupational Performance 3-5 Performance Deficits   Identified Performance Deficits bed mob, dressing, showering   Planned Therapy Interventions (OT) ADL retraining   Clinical Decision Making Complexity (OT) moderate complexity   Anticipated Equipment Needs Upon Discharge (OT) shower chair;raised toilet seat   Risk & Benefits of therapy have been explained evaluation/treatment results reviewed   OT Total Evaluation Time   OT Eval, Moderate Complexity Minutes (48116) 10   OT Goals   Therapy Frequency (OT) One time eval and treatment   OT Predicted Duration/Target Date for Goal Attainment 07/23/23   OT Goals Bed Mobility;Transfers;Toilet Transfer/Toileting;OT Goal 1   OT: Bed Mobility Minimal assist;supine to/from sitting;within precautions;Goal Met;Completed   OT: Transfer Modified independent;with assistive device;within precautions;Goal Met;Completed   OT: Toilet Transfer/Toileting Modified independent;using adaptive equipment;within precautions;Goal Met;Completed   OT: Goal 1 Pt will complete tub transfer with shower chair and Min A to support LLE.   Interventions   Interventions Quick Adds Self-Care/Home Management   Self-Care/Home Management   Self-Care/Home Mgmt/ADL, Compensatory, Meal Prep Minutes (52229) 15   Symptoms Noted During/After Treatment (Meal Preparation/Planning Training) increased pain   Treatment Detail/Skilled Intervention Pt supine in bed upon OT arrival and agreeable to therapy. Educ on role of OT and RTS/shower chair. Pt able to recall NWB LLE and reports that he was NWB in 2019. Pt Min A for supine<>sit bed mob with A to support LLE. Pt Mod I for STS, bed and toilet transfers. Educ on tub transfer with shower chair. Pt Jamar for tub trasnfers with shower chair and A to support LLE. Pt reports increased time and required rest break after transfers. Pt Mod I for ambulation bathroom<>bed with crutches. Pt declines dressing at this time,  reporting that he will just wear a robe d/t external fixator. Educ on kitchen mob and car transfer for back seat. Pt left supine in bed with all needs within reach.   OT Discharge Planning   OT Plan DC OT   OT Discharge Recommendation (DC Rec) home with assist   OT Rationale for DC Rec Pt's parents will assist as needed at home. Pt will have necessary AE for safety at home.   OT Brief overview of current status Mod I for transfers/func mon. Jamar for tub and bed mob   Total Session Time   Timed Code Treatment Minutes 15   Total Session Time (sum of timed and untimed services) 25

## 2023-07-23 NOTE — PROGRESS NOTES
Occupational Therapy Discharge Summary    Reason for therapy discharge:    All goals and outcomes met, no further needs identified.    Progress towards therapy goal(s). See goals on Care Plan in [facility] electronic health record for goal details.  Goals met    Therapy recommendation(s):    No further therapy is recommended.

## 2023-07-23 NOTE — PLAN OF CARE
VS: /61 (BP Location: Right arm, Patient Position: Supine, Cuff Size: Adult Regular)   Pulse 67   Temp 98.5  F (36.9  C) (Oral)   Resp 16   Wt 80 kg (176 lb 5.9 oz)   SpO2 94%   BMI 22.04 kg/m       O2: 94% at room air.   Output: Voiding using urinal.    Last BM: 07/20/23 per patient report.   Activity: Not OOB this shift. NWB LLE.  Blue foam leg elevator use on LLE.    Skin: LLE surgical incision    Pain: Managed by prn medications.   CMS: CMS and neuro intact.    Dressing: CDI   Diet: Regular diet. Denies nausea and vomiting.    LDA: PIV on left hand, saline locked.    Equipment: IV pole, capno, personal belongings.   Internal fixator for LLE    Plan: TBD. For PT formal evaluation 07/23   Additional Info:

## 2023-07-23 NOTE — PLAN OF CARE
VS: VSS, pt denied CP or SOB.   O2: Room air sat. > 90%   Output: Voiding adequate amount using urinal.    Last BM: 07/20/23   Activity: Not out of bed today.    Skin: Intact except surgical incision.    Pain: Manageable with PRN medication.    Neuro: CMS and neuro intact.    Dressing: CDI.    Diet: Regular tolerating okay.    LDA: PIV infusing.    Equipment: Walker, IV pole, CAPNO and personal belongings.    Plan: TBD.    Additional Info: Back to surgery this morning for some adjustment of surgical leg, pt resting with leg elevated, call light in reach.

## 2023-07-23 NOTE — PLAN OF CARE
VS: VSS, pt denied CP or SOB.   O2: Room air sat. > 90%   Output: Voiding adequate amount without difficulty.   Last BM: 07/20/23   Activity: Up walked with PT and up to bathroom with SBA.    Skin: Intact except surgical incision.    Pain: Manageable with PRN medication.    Neuro: CMS and neuro intact.    Dressing: CDI.    Diet: Regular tolerating okay.    LDA: PIV infusing.    Equipment: Walker, IV pole, CAPNO and personal belongings.    Plan: Discharge home today.    Additional Info: .        DISCHARGE SUMMARY    Pt discharging to: Home  Transportation: Family.  AVS given and discussed: yes  Stoplight Tool given and discussed: yes  Medications given: yes  Belongings returned: yes  Comments: pt understand discharge plan.

## 2023-07-23 NOTE — PLAN OF CARE
Physical Therapy Discharge Summary    Reason for therapy discharge:    All goals and outcomes met, no further needs identified.    Progress towards therapy goal(s). See goals on Care Plan in [facility] electronic health record for goal details.  Goals met    Therapy recommendation(s):    Continued therapy is recommended.  Rationale/Recommendations:  Eventual outpatient PT when external fixator removed.

## 2023-07-24 ASSESSMENT — ACTIVITIES OF DAILY LIVING (ADL)
ADLS_ACUITY_SCORE: 22

## 2023-09-23 ENCOUNTER — HEALTH MAINTENANCE LETTER (OUTPATIENT)
Age: 28
End: 2023-09-23

## 2024-11-16 ENCOUNTER — HEALTH MAINTENANCE LETTER (OUTPATIENT)
Age: 29
End: 2024-11-16

## 2024-11-21 ENCOUNTER — HOSPITAL ENCOUNTER (EMERGENCY)
Facility: CLINIC | Age: 29
Discharge: HOME | End: 2024-11-21
Payer: COMMERCIAL

## 2024-11-21 VITALS
RESPIRATION RATE: 18 BRPM | TEMPERATURE: 98.2 F | HEART RATE: 54 BPM | SYSTOLIC BLOOD PRESSURE: 137 MMHG | OXYGEN SATURATION: 99 % | DIASTOLIC BLOOD PRESSURE: 67 MMHG

## 2024-11-21 DIAGNOSIS — B86 SCABIES: ICD-10-CM

## 2024-11-21 PROCEDURE — G0463 HOSPITAL OUTPT CLINIC VISIT: HCPCS

## 2024-11-21 PROCEDURE — 99213 OFFICE O/P EST LOW 20 MIN: CPT

## 2024-11-21 RX ORDER — IVERMECTIN 3 MG/1
15 TABLET ORAL
Qty: 10 TABLET | Refills: 0 | Status: SHIPPED | OUTPATIENT
Start: 2024-11-21 | End: 2024-12-06

## 2024-11-21 ASSESSMENT — COLUMBIA-SUICIDE SEVERITY RATING SCALE - C-SSRS
2. HAVE YOU ACTUALLY HAD ANY THOUGHTS OF KILLING YOURSELF IN THE PAST MONTH?: NO
6. HAVE YOU EVER DONE ANYTHING, STARTED TO DO ANYTHING, OR PREPARED TO DO ANYTHING TO END YOUR LIFE?: NO
1. IN THE PAST MONTH, HAVE YOU WISHED YOU WERE DEAD OR WISHED YOU COULD GO TO SLEEP AND NOT WAKE UP?: NO

## 2024-11-21 NOTE — ED PROVIDER NOTES
History     Chief Complaint   Patient presents with    Rash     Rash began at  genital region has been spreading to hands , legs the past 3 days        HPI  Jean-Paul Carver is a 29 year old male who presents urgent care with chief complaint of rash.  Patient reports she developed a rash around his genital area 3 days ago.  Rash has now spread to his hands, arms, knees and anterior lower extremities.  Rash is described as small pruritic raised papules.  Rash is noted in the webspaces of his fingers.  Patient has tried hydrocortisone cream without relief.  Denies fever, chills, known allergies, new detergents, new soaps, poison ivy exposure.    Allergies:  Allergies   Allergen Reactions    Amoxicillin Other (See Comments)     Baby-mom does not remember exactly the reaction that he had.    Tolerated cefazolin 7/21/23       Problem List:    Patient Active Problem List    Diagnosis Date Noted    Left knee dislocation, initial encounter 07/21/2023     Priority: Medium    Closed fracture of proximal end of left tibia, unspecified fracture morphology, initial encounter 07/21/2023     Priority: Medium    Neck pain 02/02/2018     Priority: Medium    Healthy adult 08/12/2015     Priority: Medium        Past Medical History:    No past medical history on file.    Past Surgical History:    Past Surgical History:   Procedure Laterality Date    ADJUST EXTERNAL FIXATOR Left 7/22/2023    Procedure: Closed Reduction of Tibia, Adjust External Fixator;  Surgeon: Nick Amaya MD;  Location: [facility] OR    APPLY EXTERNAL FIXATOR LOWER EXTREMITY Left 7/21/2023    Procedure: Apply External Fixator Left Lower Extremity;  Surgeon: Jamel Moe MD;  Location: [facility] OR       Family History:    Family History   Problem Relation Age of Onset    Hypertension Mother     Other - See Comments Father         sinusitis    GERD Sister        Social History:  Marital Status:  Single [1]  Social History     Tobacco Use    Smoking status:  Never    Smokeless tobacco: Never   Substance Use Topics    Alcohol use: Yes     Comment: Alcoholic Drinks/day: occasional    Drug use: No        Medications:    ivermectin (STROMECTOL) 3 MG TABS tablet  acetaminophen (TYLENOL) 325 MG tablet  aspirin 81 MG EC tablet  cyanocobalamin (VITAMIN B-12) 100 MCG tablet  ibuprofen (ADVIL/MOTRIN) 200 MG tablet  methocarbamol (ROBAXIN) 500 MG tablet  multivitamin (CENTRUM) chewable tablet  multivitamin w/minerals (THERA-VIT-M) tablet  polyethylene glycol (MIRALAX) 17 g packet  senna-docusate (SENOKOT-S/PERICOLACE) 8.6-50 MG tablet  UNABLE TO FIND  Vitamin D3 (VITAMIN D, CHOLECALCIFEROL,) 25 mcg (1000 units) tablet          Review of Systems   All other systems reviewed and are negative.      Physical Exam   BP: 137/67  Pulse: 54  Temp: 98.2  F (36.8  C)  Resp: 18  SpO2: 99 %      Physical Exam  Vitals and nursing note reviewed.   Constitutional:       General: He is not in acute distress.     Appearance: Normal appearance. He is not ill-appearing.   Cardiovascular:      Rate and Rhythm: Normal rate.      Pulses: Normal pulses.   Pulmonary:      Effort: Pulmonary effort is normal.   Musculoskeletal:      Cervical back: Neck supple. No rigidity.   Skin:     Comments: Small raised pink papules in webspaces of fingers, anterior knee, anterior lower extremities and cubital fossa bilaterally.   Neurological:      Mental Status: He is alert.         ED Course        Procedures        No results found for this or any previous visit (from the past 24 hours).    Medications - No data to display    Assessments & Plan (with Medical Decision Making)     I have reviewed the nursing notes.    I have reviewed the findings, diagnosis, plan and need for follow up with the patient.        Medical Decision Making  29 year old male who presents urgent care with chief complaint of rash.  Patient reports she developed a rash around his genital area 3 days ago.  Rash has now spread to his hands,  arms, knees and anterior lower extremities.  Rash is described as small pruritic raised papules.  Rash is noted in the webspaces of his fingers.  Patient has tried hydrocortisone cream without relief.  Denies fever, chills, known allergies, new detergents, new soaps, poison ivy exposure.      Exam above.  Significant for rash that appears consistent with scabies.  I educated patient on scabies and recommended:Clothing and bedding used within the preceding 3 days should be machine washed and dried with hot water and a hot dry cycle, dry cleaned, or removed from human contact for at least 72 hours.  Patient started on ivermectin for treatment.  Patient reports she will return in 2 weeks if symptoms worsen or do not improve.  Others with similar symptoms close patient should follow-up for treatment.        Prior to making a final disposition on this patient the results of patient's tests and other diagnostic studies were discussed with the patient. All questions were answered. Patient expressed understanding of the plan and was amenable to it.     Disclaimer: This note consists of symbols derived from keyboarding, dictation and/or voice recognition software. As a result, there may be errors in the script that have gone undetected. Please consider this when interpreting information found in this chart.      Discharge Medication List as of 11/21/2024  3:59 PM        START taking these medications    Details   ivermectin (STROMECTOL) 3 MG TABS tablet Take 5 tablets (15 mg) by mouth every 14 days for 2 doses., Disp-10 tablet, R-0, E-Prescribe             Final diagnoses:   Scabies       11/21/2024   [facility] EMERGENCY DEPT       Marija Hernández PA-C  11/26/24 5000

## 2024-11-21 NOTE — DISCHARGE INSTRUCTIONS
Clothing and bedding used within the preceding 3 days should be machine washed and dried with hot water and a hot dry cycle, dry cleaned, or removed from human contact for at least 72 hours.

## (undated) DEVICE — DRAPE U-DRAPE 1015NSD NON-STERILE

## (undated) DEVICE — PACK SET-UP STD 9102

## (undated) DEVICE — Device

## (undated) DEVICE — DRAPE STOCKINETTE IMPERVIOUS 12" 1587

## (undated) DEVICE — DRSG XEROFORM 1X8"

## (undated) DEVICE — SOL NACL 0.9% IRRIG 1000ML BOTTLE 2F7124

## (undated) DEVICE — EXT FIX ROD SYN CARBON FIBER 11.0X400MM 394.87

## (undated) DEVICE — BNDG ELASTIC 6"X5YDS UNSTERILE 6611-60

## (undated) DEVICE — PACK LOWER EXTREMITY RIVERSIDE SOP32LEFSX

## (undated) DEVICE — ESU GROUND PAD UNIVERSAL W/O CORD

## (undated) DEVICE — GOWN XLG DISP 9545

## (undated) DEVICE — SOL WATER IRRIG 1000ML BOTTLE 2F7114

## (undated) DEVICE — DRSG KERLIX 4 1/2"X4YDS ROLL 6730

## (undated) DEVICE — GLOVE BIOGEL PI ULTRATOUCH G SZ 7.0 42170

## (undated) DEVICE — LINEN ORTHO PACK 5446

## (undated) DEVICE — SU ETHILON 3-0 PS-1 18" 1663H

## (undated) DEVICE — DRAPE C-ARMOR 5 SIDED 5523

## (undated) DEVICE — EXT FIX CLAMP SYN LG COMBINATION 390.005

## (undated) DEVICE — GLOVE BIOGEL PI MICRO INDICATOR UNDERGLOVE SZ 8.0 48980

## (undated) DEVICE — DRAPE STERI TOWEL LG 1010

## (undated) DEVICE — DRAPE C-ARM W/STRAPS 42X72" 07-CA104

## (undated) DEVICE — GLOVE BIOGEL PI MICRO SZ 8.0 48580

## (undated) RX ORDER — HYDROMORPHONE HYDROCHLORIDE 1 MG/ML
INJECTION, SOLUTION INTRAMUSCULAR; INTRAVENOUS; SUBCUTANEOUS
Status: DISPENSED
Start: 2023-07-21

## (undated) RX ORDER — CEFAZOLIN SODIUM/WATER 2 G/20 ML
SYRINGE (ML) INTRAVENOUS
Status: DISPENSED
Start: 2023-07-21

## (undated) RX ORDER — FENTANYL CITRATE 50 UG/ML
INJECTION, SOLUTION INTRAMUSCULAR; INTRAVENOUS
Status: DISPENSED
Start: 2023-07-21

## (undated) RX ORDER — OXYCODONE HYDROCHLORIDE 5 MG/1
TABLET ORAL
Status: DISPENSED
Start: 2023-07-21

## (undated) RX ORDER — ACETAMINOPHEN 325 MG/1
TABLET ORAL
Status: DISPENSED
Start: 2023-07-21

## (undated) RX ORDER — FENTANYL CITRATE 50 UG/ML
INJECTION, SOLUTION INTRAMUSCULAR; INTRAVENOUS
Status: DISPENSED
Start: 2023-07-22

## (undated) RX ORDER — HYDROXYZINE HYDROCHLORIDE 25 MG/1
TABLET, FILM COATED ORAL
Status: DISPENSED
Start: 2023-07-21

## (undated) RX ORDER — SODIUM CHLORIDE, SODIUM LACTATE, POTASSIUM CHLORIDE, CALCIUM CHLORIDE 600; 310; 30; 20 MG/100ML; MG/100ML; MG/100ML; MG/100ML
INJECTION, SOLUTION INTRAVENOUS
Status: DISPENSED
Start: 2023-07-22

## (undated) RX ORDER — KETOROLAC TROMETHAMINE 30 MG/ML
INJECTION, SOLUTION INTRAMUSCULAR; INTRAVENOUS
Status: DISPENSED
Start: 2023-07-21